# Patient Record
Sex: MALE | Race: WHITE
[De-identification: names, ages, dates, MRNs, and addresses within clinical notes are randomized per-mention and may not be internally consistent; named-entity substitution may affect disease eponyms.]

---

## 2017-02-25 NOTE — UC
Mary KATE Matthew, scribed for [facility] ServicesYaya MD on 02/25/17 at 1247 .





Lower Extremity/Ankle HPI





- HPI Summary


HPI Summary: 





Nurse's Note: pt states that over the past few weeks he's had a red, swollen, 

hard left lower leg that has been worsening, and is much worse over the past 

few days. pt's son states that the house pt is staying in may also have 

bedbugs. per pt's son, pt has "hardening of the arteries", and both legs have 

this problem off and on, though usually more on the right leg. on his right leg 

he had surgery 5 years ago and has had continual problems and infections with 

right leg. pt also sleeps with both legs dependent, he cannot elevate them. 








MD Note: MD Note; Afebrile, pulse oxygen 99%, 10/10 discomfort , weekly alcohol

, .5 ppd smoker, MI 15 years ago, chronic right leg infections. 








In Room Note: A 67 y/o male presents to [facility] with increased pre tibial right 

leg pain. He always has chronic right leg pain. The patient has been taking 

amoxicillin for 5 years according to the son for a chronic knee infection. The 

patient is unable to move his right leg at the joint. He denies nausea, vomiting

, and diarrhea today. He also has chronic back pain. Dr. Nicolas requested the 

patient present to [facility] for possible skin infection. No Hx of diabetes. Where 

the patient's living has an infestation of bed bugs and the son states the 

patient may have bee scratching his legs. 








- History of Current Complaint


Chief Complaint: UCLowerExtremity


Stated Complaint: LEG COMPLAINT


Time Seen by Provider: 02/25/17 12:25


Hx Obtained From: Patient


Onset/Duration: Still Present


Severity Initially: Moderate


Severity Currently: Moderate


Pain Intensity: 10


Pain Scale Used: 0-10 Numeric





- Allergies/Home Medications


Allergies/Adverse Reactions: 


 Allergies











Allergy/AdvReac Type Severity Reaction Status Date / Time


 


Fentanyl Allergy Severe Itching Verified 02/25/17 11:28


 


FENTANYL PATCH Allergy Severe SEVERE Uncoded 02/25/17 11:28





   ITCHING  














PMH/Surg Hx/FS Hx/Imm Hx


Endocrine History Of: 


   Denies: Diabetes, Thyroid Disease


Cardiovascular History Of: Reports: Cardiac Disorders - Heart attack 15 years 

ago, 8 years ago "on life support bc heart stopped", Myocardial Infarction, 

Deep Vein Thrombosis


   Denies: Hypertension, Pacemaker/ICD, Congestive Heart Failure


Respiratory History Of: Reports: COPD, Asthma


   Denies: Bronchitis, Pulmonary Embolism


GI/ History Of: 


   Denies: Ulcer, Renal Disease


Neurological History Of: 


   Denies: Seizures, Migraine


Psychological History Of: Reports: Depression


   Denies: Anxiety


Other History Of: 


   Negative For: Anticoagulant Therapy





- Surgical History


Surgical History: Yes


Surgery Procedure, Year, and Place: [place] [facility] [place]; RTHR 

April 2014; Right total knee replacement june 5 2014, [facility] [place].  right ear surgery





- Family History


Known Family History: Positive: Cardiac Disease, Other - CANCER





- Social History


Alcohol Use: Weekly


Alcohol Amount: NOT SINCE NEW YEARS


Substance Use Type: None


Smoking Status (MU): Current Every Day Smoker


Type: Cigarettes


Amount Used/How Often: 1 - 2 PPD


Length of Time of Smoking/Using Tobacco: 50 YEARS


Have You Smoked in the Last Year: Yes


When Did the Patient Quit Smoking/Using Tobacco: 2 MONTHS AGO


Household Exposure Type: Cigarettes





- Immunization History


Most Recent Influenza Vaccination: 2013


Most Recent Tetanus Shot: Within past 10 years


Most Recent Pneumonia Vaccination: 2012





Review of Systems


Constitutional: Negative


Skin: Other - Right leg erythema


Eyes: Negative


ENT: Negative


Respiratory: Negative


Cardiovascular: Negative


Gastrointestinal: Negative


Genitourinary: Negative


Motor: Negative


Neurovascular: Negative


Musculoskeletal: Myalgia - Increased right leg pain


Neurological: Negative


Psychological: Negative


All Other Systems Reviewed And Are Negative: Yes





Physical Exam


Triage Information Reviewed: Yes


Appearance: Other: - unkempt


Vital Signs: 


 Initial Vital Signs











Temp  98.6 F   02/25/17 11:17


 


Pulse  94   02/25/17 11:17


 


Resp  16   02/25/17 11:17


 


BP  114/54   02/25/17 11:17


 


Pulse Ox  99   02/25/17 11:17











Vital Signs Reviewed: Yes


Eyes: Positive: Conjunctiva Clear


ENT: Positive: Hearing grossly normal, Pharynx normal, TMs normal, Other: - 

[place].  Negative: Muffled/hoarse voice


Neck: Positive: Supple, Nontender


Respiratory: Positive: Chest non-tender, Lungs clear, Normal breath sounds, No 

respiratory distress


Cardiovascular: Positive: RRR, No Murmur


Abdomen Description: Positive: Nontender, No Organomegaly, Soft


Bowel Sounds: Positive: Present


Musculoskeletal: Positive: Other: - THE RIGHT LEG WILL NOT EXTENDED AT THE KNEE 

JOIN;


Neurological: Positive: Alert


Psychological: Positive: Age Appropriate Behavior


Skin Exam: Other -  STASIS DERMATITIS BILATERAL LE; BOTH LEGS SHOW ERYTHEMA AND 

ITS DIFFICULTY TO TELL WHETHER THIS IS AN INFECTION OR CHRONIC





Lower Extremity Course/Dx





- Course


Course Of Treatment: Discussed with the patient and his son who is his caregiver

, the fact that his examination is difficulty because of chronic leg changes. 

The fact that hes c/o of increase RLE pain and erythema could be a new skin 

infection that would not be covered by the amoxicillin. I am therefore changing 

him to Keflex and the patient son will recheck with Dr. Nicolas on Monday.





- Differential Dx/Diagnosis


Differential Diagnosis/HQI/PQRI: Other - New onset RLE cellulitis vs chronic 

stasis changes


Provider Diagnoses: Cellulitis, pre-tibial, right





Discharge





- Discharge Plan


Condition: Stable


Disposition: HOME


Prescriptions: 


Cephalexin CAP* [Keflex CAP*] 500 mg PO TID #30 cap MDD 3


Patient Education Materials:  Cellulitis (ED)


Referrals: 


Randi Nicolas MD [Primary Care Provider] - 


Additional Instructions: 


AS WE DISCUSSED:





You may have a new skin infection of your right lower leg.  It's hard to tell 

because of your chronic changes.





Change your medication for the next 10 days and check with Dr. Nicolas on 

Monday. GO TO ED FOR INCREASED PAIN, TEMPERATURE, SWELLING REDNESS.





Warm moist heat to the area of discomfort and redness.  





The documentation as recorded by the Mary gay Matthew accurately 

reflects the service I personally performed and the decisions made by me, 

Yaya Weber MD.

## 2017-08-14 NOTE — RAD
INDICATION:  Right lower extremity swelling and erythema.



COMPARISON:  Comparison is made with a prior lower extremity venous duplex study from

March 10, 2017.



TECHNIQUE:  Multiple real-time, color flow and Doppler tracings of the right lower

extremity were obtained.



FINDINGS: The common femoral, femoral, profunda femoral and popliteal veins all

demonstrate normal compressibility, augmentation with compression and phasic response with

respiration.



The posterior tibial and peroneal veins demonstrate normal compressibility and

augmentation with compression.



IMPRESSION:  NO EVIDENCE FOR DEEP VENOUS THROMBOSIS.

## 2017-08-14 NOTE — RAD
INDICATION:  Shortness of breath.



COMPARISON:  Comparison is made with a prior chest x-ray study from September 11, 2014.



TECHNIQUE: AP and lateral views of the chest were obtained.



FINDINGS:   The heart is within normal limits in size. Mediastinal and hilar contours

appear within normal limits.



The right lung base is cut off in the AP view limiting the study. The lungs are

hyperinflated with flattening of the diaphragms consistent with chronic obstructive

pulmonary disease. There are small bibasilar infiltrates. No pleural effusion is seen. 



IMPRESSION:  

1. LIMITED STUDY.

2. SMALL BIBASILAR INFILTRATES.

3. COPD.

## 2017-08-15 NOTE — RAD
Indication: Right lower extremity arterial insufficiency.



Contrast: Administered 125.1 ml of OMNIPAQUE 350 mg/ml



CTA of the abdominal aorta and lower extremity runoff was performed. Noncontrast and

arterial phase images were obtained.



The abdominal aorta demonstrates no aneurysmal dilatation. Celiac axis and superior

mesenteric artery are patent although there is calcified origin of the superior mesenteric

artery. Atherosclerotic aorta is noted. Calcified common iliac arteries are noted.

Atherosclerosis of both external iliac artery is noted.



The right common femoral artery, femoral artery and popliteal artery are limited in

evaluation of the right lower extremity. The left lower extremity demonstrates

atherosclerosis without definite stenosis of the left common femoral artery and femoral

artery. Atherosclerosis of the distal left femoral artery is present.



In the right calf pain appears to be a patent posterior artery and anterior tibial artery.

The left calf demonstrates patent anterior tibial, peroneal and posterior tibial artery.



The liver demonstrates hepatic steatosis. The pancreas demonstrates no mass or pancreatic

ductal dilatation. The spleen is normal in size. No adrenal lesions are noted. The kidneys

demonstrate symmetric nephrograms. Pancreas demonstrates no mass or pancreatic duct

dilatation. The colon is filled with stool.



IMPRESSION: Extensive hardware is noted in the right lower extremity. The calf vessels

demonstrates patent posterior tibial and anterior tibial arteries.



The left lower extremity demonstrates three-vessel runoff with atherosclerosis throughout.



The aorta and iliac arteries demonstrates atherosclerosis.

## 2017-08-15 NOTE — ECHO
Patient:      CHRISTOPHER ISBELL 

[facility] Rec#:     [id number]            :          1950          

Date:         08/15/2017            Age:          66y                 

Account#:     [id number]          Height:       175.3 cm / 69.0 in

Accession#:   [id number]           Weight:       75.3 kg / 166.0 lbs

Sex:          M                     BSA:          1.9

Room#:        [facility]                 

Admit Date#:  2017          

Type:         Inpatient

 

Referring:    Matteo Hdz

Reading:      Wilbert Dorsey MD

Sonographer:  Светлана Davidson RN RDCS

CC:           Randi Nicolas MD

______________________________________________________________________

 

Transthoracic Echocardiogram

 

Indication:

Lower extremity edema

BP:           105/57

HR:           87

Rhythm:       NSR with PACs

 

Findings     

History:

CAD, MI, PVD, COPD, factor V Leiden deficiency, ETOH abuse, smokes

cigars 

 

Technical Comments:

The study is technically limited due to poor parasternal windows.  The

study is technically limited due to the patient's history of COPD.  The

study is technically limited due to the patient's smoking history.

Completed at 1000.  

 

Left Ventricle:

The left ventricular chamber size is normal. Septal wall hypertrophy is

observed. There is a focal wall motion abnormality present.The posterior

wall wall appears more hypokinetic than the anteroseptal inferior wall.

There is mildly decreased left ventricular systolic function. The

estimated ejection fraction is 40-45%.  Abnormal left ventricular

diastolic filling is observed, consistent with impaired relaxation. The

absence of left atrial enlargement suggests this finding is not of a

chronic nature. 

 

Left Atrium:

The left atrial chamber size is normal. 

 

Right Ventricle:

The right ventricle wall thickness is mildly increased. The right

ventricular cavity size is normal. The right ventricular global systolic

function is mildly reduced. 

 

Right Atrium:

The right atrial cavity size is normal. There is evidence of an atrial

septal aneurysm.  

 

Aortic Valve:

The aortic valve leaflets are mildly thickened. There is no evidence of

aortic regurgitation. There is no evidence of aortic stenosis. 

 

Mitral Valve:

The mitral valve leaflets appear normal. There is a trace of mitral

regurgitation. There is no evidence of mitral stenosis. 

 

Tricuspid Valve:

The tricuspid valve leaflets are normal.  There is trace to mild

tricuspid regurgitation. There is evidence of moderate pulmonary

hypertension. 

 

Pulmonic Valve:

The pulmonic valve structure is not well visualized. 

 

Pericardium:

There is no significant pericardial effusion. A pericardial fat pad is

visualized. 

 

Aorta:

The ascending aorta is not well visualized. The aortic arch is not well

visualized.  There is no dilation of the aortic root. 

 

Pulmonary Artery:

The main pulmonary artery is not well visualized. 

 

Venous:

The inferior vena cava is dilated.  There is a greater than 50%

respiratory change in the inferior vena cava dimension. 

 

Conclusions

The posterior wall wall appears more hypokinetic than the anteroseptal

inferior wall.

There is mildly decreased left ventricular systolic function.

The estimated ejection fraction is 40-45%. 

Abnormal left ventricular diastolic filling is observed, consistent with

impaired relaxation. The absence of left atrial enlargement suggests

this finding is not of a chronic nature.

The right ventricular global systolic function is mildly reduced.

There is a trace of mitral regurgitation.

There is trace to mild tricuspid regurgitation.

There is evidence of moderate pulmonary hypertension.

The inferior vena cava is dilated. 

There is a greater than 50% respiratory change in the inferior vena cava

dimension.

Compared to 2013 the pulmonary HTN is now noted. 

 

Measurements     

Name                    Value         Normal Range            

RVDdMajor (2D)          3.3 cm        (2.2 - 4.4)             

RVAW (2D)               0.9 cm        (0.2 - 0.5)             

RAd ISD 4CH             4.4 cm        (3.4 - 4.9)             

RA (A4C)W               4 cm          (2.9 - 4.6)             

IVSd (2D)               1.1 cm        (0.6 - 1)               

LVPWd (2D)              1 cm          (0.6 - 1)               

LVIDd (2D)              4.4 cm        (3.6 - 5.4)             

LVIDs (2D)              3.4 cm        -                        

LV FS (2D)              23 %          (25 - 45)               

Aortic Annulus          2.3 cm        (1.4 - 2.6)             

Ao root diameter (2D)   3.2 cm        (2.1 - 3.5)             

LA dimension (AP) 2D    3.6 cm        (2.3 - 3.8)             

LAd ISD 4CH             4.3 cm        (2.9 - 5.3)             

LA ISD 4CH W            3.7 cm        (2.5 - 4.5)             

 

Name                    Value         Normal Range            

LA ESV SP 4CH (A/L)     28 ml         -                        

LA ESV SP 2CH (A/L)     44 ml         -                        

LA ESV BP (A/L)         37 ml         -                        

LA ESV BP (A/L) index   19.4 ml/m2    -                        

LA ESV SP 4CH (MOD)     25 ml         -                        

LA ESV SP 2CH (MOD)     38 ml         -                        

 

Name                    Value         Normal Range            

MV E-wave Vmax          0.41 m/sec    -                        

MV deceleration time    174 msec      -                        

MV A-wave Vmax          0.83 m/sec    -                        

MV E:A ratio            0.5 ratio     -                        

LV septal e' Vmax       0.07 m/sec    -                        

LV lateral e' Vmax      0.08 m/sec    -                        

LV E:e' septal ratio    5.9 ratio     -                        

LV E:e' lateral ratio   5.1 ratio     -                        

 

Name                    Value         Normal Range            

AV Vmax                 1.3 m/sec     -                        

AV VTI                  29.1 cm       -                        

AV peak gradient        6.4 mmHg      -                        

AV mean gradient        4.3 mmHg      -                        

LVOT Vmax               0.93 m/sec    -                        

LVOT VTI                17.6 cm       -                        

LVOT peak gradient      3.5 mmHg      -                        

LVOT mean gradient      1.7 mmHg      -                        

 

Name                    Value         Normal Range            

TR Vmax                 3.2 m/sec     -                        

TR peak gradient        41 mmHg       -                        

RAP                     8 mmHg        -                        

RVSP                    49 mmHg       -                        

IVC diameter            2.2 cm        -                        

 

Name                    Value         Normal Range            

PV Vmax                 0.61 m/sec    -                        

 

Electronically signed by: Wilbert Dorsey MD on 08/15/2017 11:50:32

## 2017-08-15 NOTE — HP
CC:  Randi Nicolas MD *

 

HISTORY AND PHYSICAL:

 

DATE OF ADMISSION:  08/14/17

 

PRIMARY CARE PHYSICIAN:  Randi Nicolas MD.

 

CHIEF COMPLAINT:  Right leg pain.

 

HISTORY OF PRESENT ILLNESS:  The patient is a 66-year-old gentleman who 
presented to [facility] with a chief complaint of right leg pain.  
He cannot tell me precisely when it started but it has been getting worse over 
the last couple of months.  He became more worried today when there was fluid 
actually draining out of his leg.  His son insisted he go to the emergency room 
for evaluation.  In fact, he was at his PCP's yesterday for evaluation and 
treatment.  He denies any fevers, but has had chills occasionally.  It is red, 
warm and somewhat tender.  He took some pain medicine prescribed to him by his 
PCP, but he said it did not help that much. He denies any shortness of breath, 
chest pain or palpitations.  In the ED, the patient was evaluated and found 
likely to have a cellulitis.  There was also concern for a possible pneumonia 
but he has no white count, he is afebrile and he is not short of breath.

 

PAST MEDICAL HISTORY:  He has got a past medical history significant for COPD, 
thrombocytopenia, peripheral vascular disease, coronary artery disease, history 
of an MI, chronic hyponatremia, alcohol abuse, osteoporosis, GI bleed, history 
of factor v Leiden deficiency.

 

PAST SURGICAL HISTORY:  Significant for distal femur replacement, right hip ORIF
, right hip replacement, hernia repair, ear surgery.

 

MEDICATIONS:  The patient is unaware of his medications.  We will attempt to 
get the list tomorrow.

 

ALLERGIES:  Adverse reaction to FENTANYL.

 

FAMILY HISTORY:  Mother had a heart disease.  Father had history of unknown 
cancer.

 

SOCIAL HISTORY:  He still smokes about 3 cigars a day.  Occasional alcohol, 
last week he had 4 to 5 cans in 1 day.  No recreational drug use.  He 
apparently has a history of more significant alcohol use.  He is a retired 
worker.  He is . He has 2 sons.  His son, Mushtaq Bwoden, is his 
healthcare proxy.

 

REVIEW OF SYSTEMS:  A 14-point review of systems was completed with the 
patient. All pertinent positives and negatives are in the history of present 
illness, otherwise it is negative.

 

                               PHYSICAL EXAMINATION

 

GENERAL:  A very pleasant gentleman, sitting up in bed, in no acute distress.

 

VITAL SIGNS:  Temperature 98 degrees, heart rate 90 beats per minute, 
respiratory rate 20 breaths per minute, pulse ox 92% on room air, blood 
pressure 130/72.

 

HEENT:  Normocephalic, atraumatic.  Pupils equal, round, and reactive to light. 
Poor dentition.

 

CHEST:  Clear to auscultation and percussion bilaterally.

 

CARDIOVASCULAR:  S1, S2 appreciated.  Regular rate and rhythm.

 

ABDOMEN:  Positive bowel sounds in all 4 quadrants.  Soft and nontender.

 

EXTREMITIES:  No cyanosis or clubbing.  He has got edema, redness, warmth and 
tenderness on his right lower calf.

 

NEUROLOGIC:  He is alert and oriented x3.  Moves all extremities.

 

SKIN:  No rashes.  The only abnormality is the erythema on his right lower 
extremity and the onychomycosis on his toenails.

 

 DIAGNOSTIC STUDIES/LABORATORY DATA:  White count 8.3, hemoglobin 12.2, 
hematocrit 38, platelets 256,000.  Sodium is 127, potassium 3.8, chloride 92, 
CO2 30, BUN 5, creatinine 0.67, glucose is 98, lactic acid is 1.2.  Urinalysis 
is unremarkable.

 

EKG shows normal sinus rhythm at 86 beats per minute.  Normal axis.  No acute 
ST or T-wave changes.  



Venous Duplex was interpreted by Radiology as no evidence for DVT. 



Chest x-ray showed limited study, small basilar infiltrates, bibasilar 
infiltrates and COPD.

 

ASSESSMENT AND PLAN:

1.  Cellulitis, likely diagnosis.  I think it is unlikely he has a deep vein 
thrombosis, especially with a negative venous Duplex.  Apparently he has factor 
V Leiden deficiency; however, and was supposed to be on anticoagulation.  This 
has been ______ in the past and he is still not on it.  I am not sure how 
compliant he is with his followup and his medications.  For now, I will put him 
on vancomycin and Zosyn because his cellulitis appears fairly significant and I 
do not know how long this has been going on for.  It may benefit from ID 
consult.  Right now, there does not appear to be any kind of a wound culture.

2.  Chronic obstructive pulmonary disease.  His current medications are 
unknown. We will await his medications and place him on them as soon as we are 
aware.

3.  Gastroesophageal reflux disease.  We will place him on PPIs that he has 
been on before.

4.  Coronary artery disease.  Again unclear what medications, but we will 
reinitiate once we are given his appropriate list.

5.  Factor V Leiden deficiency.  See above.  Thought he should be on 
anticoagulation, but has not been.  This should be addressed, but probably best 
as an outpatient.

6.  FEN.  Regular diet.

7.  DVT prophylaxis.  Heparin subcu.

8.  The patient is a full code.

 

TIME SPENT:  Over 85 minutes were spent on this H and P; more than 45 minutes 
was spent in direct face-to-face contact with the patient in evaluation, 
physical exam, counseling, and coordination of care.

 

[id number]/CPS #: [id number]

MTDD

## 2017-08-15 NOTE — PN
Subjective


Date of Service: 08/15/17


Interval History: 





This is a 67 yo gentleman with multiple medical problems including PVD who 

presented yesterday with c/o RLE pain.  Pain has been present for the last 

couple of month but progressively worse and he noted some drainage yesterday.  

He was admitted yesterday with concern for cellulitis but no leukocytosis or 

fever noted.





Today, patient reports his pain and appearance of his leg is similar to the 

time of admission.  He has remained afebrile.  He reports improvement in pain 

with legs in a dependent position.





Objective


Active Medications: 








Acetaminophen (Tylenol Tab*)  650 mg PO Q4H PRN


   PRN Reason: FEVER/PAIN


   Last Admin: 08/15/17 09:17 Dose:  650 mg


Heparin Sodium (Porcine) (Heparin Vial(*))  5,000 units SUBCUT Q8HR [facility]


   Last Admin: 08/15/17 05:56 Dose:  5,000 units


Hydromorphone HCl (Dilaudid Iv*)  1 mg IV SLOW PU Q4H PRN


   PRN Reason: PAIN


Piperacillin Sod/Tazobactam (Sod 3.375 gm/ Sodium Chloride)  100 mls @ 25 mls/

hr IVPB Q8H [facility]


   Last Admin: 08/15/17 10:59 Dose:  25 mls/hr


Vancomycin HCl 1,000 mg/ (Sodium Chloride)  250 mls @ 166.667 mls/hr IVPB Q8H 

[facility]


   Last Admin: 08/15/17 05:56 Dose:  166.667 mls/hr


Morphine Sulfate (Morphine Inj (Syringe)*)  2 mg IV Q2H PRN


   PRN Reason: PAIN


Ondansetron HCl (Zofran Inj*)  4 mg IV Q4H PRN


   PRN Reason: NAUSEA


Pharmacy Consult (Vancomycin Per Pharmacy*)  1 note FOLLOW UP . PRN


   PRN Reason: PER PROTOCOL


Pharmacy Profile Note (Vancomycin Trough Check)  1 note FOLLOW UP 0530 ONE


   Stop: 08/16/17 05:31











Vital Signs:











Temp Pulse Resp BP Pulse Ox


 


 98.0 F   84   20   114/67   96 


 


 08/15/17 09:13  08/15/17 09:13  08/15/17 11:38  08/15/17 09:13  08/15/17 09:13











Oxygen Devices in Use Now: None


Appearance: 67 yo gentleman who appears much older than stated age in NAD, but 

very uncomfortable with manipulation of his R leg


Respiratory: Symmetrical Chest Expansion and Respiratory Effort, - - few exp 

wheezes noted


Cardiovascular: NL Sounds; No Murmurs; No JVD, RRR


Extremities: - - 1+RLE edema and trace LLE edema, RLE is very TTP, no palpable 

pulses


Skin: - - erythema of RLE including the foot and lower leg to the knee


Result Diagrams: 


 08/15/17 09:12





 08/15/17 09:12





Assess/Plan/Problems-Billing


Assessment: 





This is a 67 yo gentleman with COPD, thrombocytopenia, PVD, CAD with h/o AMI, 

chronic hyponatremia, alcoholism, factor V leiden who presented with c/o RLE 

pain who was admitted for possible cellulitis.





- Patient Problems


(1) Acute pain of right lower extremity


Comment: 


Initial treatment for cellulitis, but he has no leukocytosis or fever


He reports improvement with legs in a dependent position and he has no palpable 

distal pulses with known PVD, so concern for arterial insufficiency is higher 

on my differential


Will cont abx, but obtain CTA with runoff to assess vascular status   





(2) COPD (chronic obstructive pulmonary disease)


Comment: 


Few wheezes on exam but no hypoxia or resp distress to suggest exacerbation


Cont home inhalers and prn DuoNebs   





(3) PVD (peripheral vascular disease)


Comment: 


Start ASA, unsure if he is on a statin at home   





(4) CAD (coronary artery disease)


Comment: 


No evidence of ACS


Cont med management   





(5) Hyponatremia


Comment: 


Mild and appears chronic


Asymptomatic, will cont to monitor   





(6) Alcoholism


Comment: 


No evidence of acute withdrawal at this time   





(7) Factor V Leiden mutation


Comment: 


Reported h/o of anticoagulation, but patient is unclear on this history   





(8) Full code status








(9) DVT prophylaxis


Comment: 


SQ heparin   


Status and Disposition: 





Inpatient. Unsure of discharge plan at this time

## 2017-08-15 NOTE — ED
Justo KATE Thomas, scribed for Tashi English MD on 08/14/17 at 1654 .





Lower Extremity





- HPI Summary


HPI Summary: 





The pt is a 65 y/o M presenting to the ED c/o chronic R leg pain that began two 

months ago. The pain is aggravated by movement and the pain is alleviated by 

nothing. The patient has not treated the pain with anything PTA. Pt 

additionally c/o fluid drainage to his R leg, R leg erythema, and SOB .PMHx: MI

, DVT, asthma, and COPD. PSHx: femur rodding, R knee replacement, and ear 

surgery. SHx: smoking (1/2 PPD), occasional alcohol use, no illicit drug use. 

FHx: cardiac disease, CA. The patient does not shower and reports that he 

occasionally takes sponge baths. 





- History of Current Complaint


Chief Complaint: EDShortnessOfBreath


Stated Complaint: SWELLING IN LEGS


Time Seen by Provider: 08/14/17 16:39


Hx Obtained From: Patient


Severity Currently: Moderate


Timing: Constant


Associated Signs And Symptoms: Positive: Other - POS: R leg pain, R leg erythema

, drainage to R leg, and SOB


Aggravating Factor(s): Movement


Alleviating Factor(s): Nothing





- Allergies/Home Medications


Allergies/Adverse Reactions: 


 Allergies











Allergy/AdvReac Type Severity Reaction Status Date / Time


 


Fentanyl Allergy Severe Itching Verified 02/25/17 11:28


 


FENTANYL PATCH Allergy Severe SEVERE Uncoded 02/25/17 11:28





   ITCHING  














PMH/Surg Hx/FS Hx/Imm Hx


Previously Healthy: No


Endocrine/Hematology History: Reports: Hx Blood Disorders - factor 5 clotting 

disorder


   Denies: Hx Anticoagulant Therapy, Hx Diabetes, Hx Thyroid Disease


Cardiovascular History: Reports: Hx Angina, Hx Coronary Artery Disease, Hx Deep 

Vein Thrombosis, Hx Myocardial Infarction, Hx Peripheral Vascular Disease, 

Other Cardiovascular Problems/Disorders - fACTOR V LEIDEN CLOTTING DISORDER


   Denies: Hx Cardiomegaly, Hx Congestive Heart Failure, Hx Hypertension, Hx 

Pacemaker/ICD, Hx Rheumatic Fever, Hx Valvular Heart Disease


Respiratory History: Reports: Hx Asthma, Hx Chronic Obstructive Pulmonary 

Disease (COPD), Other Respiratory Problems/Disorders - Longterm smoker


   Denies: Hx Pulmonary Edema, Hx Pulmonary Embolism


GI History: Reports: Hx Gastroesophageal Reflux Disease, Other GI Disorders - 

HX OF GI BLEED - NO PROBLEMS NOW


   Denies: Hx Cirrhosis, Hx Crohn's Disease, Hx Hiatal Hernia, Hx Irritable 

Bowel, Hx Jaundice, Hx Ulcer


 History: 


   Denies: Hx Renal Disease, Other  Problems/Disorders


Musculoskeletal History: Reports: Hx Arthritis, Hx Back Problems, Hx 

Osteoporosis, Other Musculoskeletal History - Fx hip sp fall at home hx


   Denies: Hx Rheumatoid Arthritis, Hx Bursitis


Sensory History: Reports: Hx Cataracts - HAVING CATARACT SURGERY, Hx Contacts 

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


   Denies: Hx Hearing Aid


Opthamlomology History: Reports: Hx Cataracts - HAVING CATARACT SURGERY, Hx 

Contacts or Glasses - for reading


Neurological History: 


   Denies: Hx Headaches, Hx Migraine, Hx Seizures


   Comment Only: Other Neuro Impairments/Disorders - NEUROPATHY/HX SUBSTANCE 

ABUSE


Psychiatric History: Reports: Hx Depression, Hx Substance Abuse


   Denies: Hx Anxiety, Hx Panic Disorder





- Cancer History


Hx Chemotherapy: No





- Surgical History


Surgery Procedure, Year, and Place[place] [facility] [place]TH[place] 

April 2014; Right total knee replacement june 5 2014, [facility] [place].  right ear surgery


Hx Anesthesia Reactions: No





- Immunization History


Date of Tetanus Vaccine: 2011


Date of Influenza Vaccine: None


Infectious Disease History: No


Infectious Disease History: Reports: Hx of Known/Suspected MRSA


   Denies: Hx Hepatitis, Traveled Outside the [place] in Last 30 Days





- Family History


Known Family History: Positive: Cardiac Disease, Other - CANCER





- Social History


Alcohol Use: Weekly


Alcohol Amount: NOT SINCE NEW YEARS


Substance Use Type: Reports: None


Hx Tobacco Use: Yes


Smoking Status (MU): Current Every Day Smoker


Type: Cigarettes


Amount Used/How Often: 1 - 2 PPD


Length of Time of Smoking/Using Tobacco: 50 YEARS


Have You Smoked in the Last Year: Yes





Review of Systems


Positive: Shortness Of Breath


Positive: Other - POS: R leg pain with drainage


Positive: Other - POS: R leg erythema


All Other Systems Reviewed And Are Negative: Yes





Physical Exam





- Summary


Physical Exam Summary: 





VITAL SIGNS: Reviewed.


GENERAL: ~Patient is an elderly male with poor hygiene who is lying comfortable 

in the stretcher. ~Patient is not in any acute respiratory distress.


HEAD AND FACE: No signs of trauma. ~No ecchymosis, hematomas or skull 

depressions. No sinus tenderness.


EYES: PERRLA, EOMI x 2, No injected conjunctiva, no nystagmus.


EARS: Hearing grossly intact. Ear canals and tympanic membranes are within 

normal limits.


MOUTH: Oropharynx within normal limits.


NECK: Supple, trachea is midline, no adenopathy, no JVD, no carotid bruit, no c-

spine tenderness, neck with full ROM.


CHEST: Symmetric, no tenderness at palpation


LUNGS: Clear to auscultation bilaterally. No wheezing or crackles.


CVS: Regular rate and rhythm, S1 and S2 present, no murmurs or gallops 

appreciated.


ABDOMEN: Soft, non-tender. No signs of distention. No rebound no guarding, and 

no masses palpated. Bowel sounds are normal.


EXTREMITIES: He has RLE pain. The RLE is with positive erythema and positive 

tenderness to the calf. He has good pulses in his extremities. FROM in all 

major joints, no edema, no cyanosis or clubbing.


NEURO: Alert and oriented x 3. No acute neurological deficits. Speech is normal 

and follows commands.


SKIN: Dry and warm


Triage Information Reviewed: Yes


Vital Signs On Initial Exam: 


 Initial Vitals











Pulse BP Pulse Ox


 


 155   121/57   89 


 


 08/14/17 16:26  08/14/17 16:26  08/14/17 16:26











Vital Signs Reviewed: Yes





- Mindi Coma Scale


Coma Scale Total: 15





Diagnostics





- Vital Signs


 Vital Signs











  Temp Pulse Resp BP Pulse Ox


 


 08/14/17 16:33  98.7 F  85  16  121/57  99


 


 08/14/17 16:30   83  16  125/62  97


 


 08/14/17 16:26   155   121/57  89














- Laboratory


Lab Results: 


 Lab Results











  08/14/17 08/14/17 08/14/17 Range/Units





  16:45 16:45 16:45 


 


WBC  8.3    (3.5-10.8)  10^3/ul


 


RBC  4.81    (4.0-5.4)  10^6/ul


 


Hgb  12.2 L    (14.0-18.0)  g/dl


 


Hct  38 L    (42-52)  %


 


MCV  79 L    (80-94)  fL


 


MCH  25 L    (27-31)  pg


 


MCHC  32    (31-36)  g/dl


 


RDW  20 H    (10.5-15)  %


 


Plt Count  256    (150-450)  10^3/ul


 


MPV  8    (7.4-10.4)  um3


 


Neut % (Auto)  68.0    (38-83)  %


 


Lymph % (Auto)  11.0 L    (25-47)  %


 


Mono % (Auto)  14.9 H    (1-9)  %


 


Eos % (Auto)  4.8    (0-6)  %


 


Baso % (Auto)  1.3    (0-2)  %


 


Absolute Neuts (auto)  5.6    (1.5-7.7)  10^3/ul


 


Absolute Lymphs (auto)  0.9 L    (1.0-4.8)  10^3/ul


 


Absolute Monos (auto)  1.2 H    (0-0.8)  10^3/ul


 


Absolute Eos (auto)  0.4    (0-0.6)  10^3/ul


 


Absolute Basos (auto)  0.1    (0-0.2)  10^3/ul


 


Absolute Nucleated RBC  0.01    10^3/ul


 


Nucleated RBC %  0.1    


 


Sodium   127 L   (133-145)  mmol/L


 


Potassium   3.8   (3.5-5.0)  mmol/L


 


Chloride   92 L   (101-111)  mmol/L


 


Carbon Dioxide   30   (22-32)  mmol/L


 


Anion Gap   5   (2-11)  mmol/L


 


BUN   5 L   (6-24)  mg/dL


 


Creatinine   0.67   (0.67-1.17)  mg/dL


 


Est GFR ( Amer)   152.2   (>60)  


 


Est GFR (Non-Af Amer)   118.3   (>60)  


 


BUN/Creatinine Ratio   7.5 L   (8-20)  


 


Glucose   98   ()  mg/dL


 


Lactic Acid    1.2  (0.5-2.0)  mmol/L


 


Calcium   8.3 L   (8.6-10.3)  mg/dL


 


Total Bilirubin   0.50   (0.2-1.0)  mg/dL


 


AST   17   (13-39)  U/L


 


ALT   7   (7-52)  U/L


 


Alkaline Phosphatase   124 H   ()  U/L


 


C-Reactive Protein   4.94   (< 5.00)  mg/L


 


Total Protein   8.9   (6.4-8.9)  g/dL


 


Albumin   3.3   (3.2-5.2)  g/dL


 


Globulin   5.6 H   (2-4)  g/dL


 


Albumin/Globulin Ratio   0.6 L   (1-3)  











Result Diagrams: 


 08/15/17 09:12





 08/15/17 09:12


Lab Statement: Any lab studies that have been ordered have been reviewed, and 

results considered in the medical decision making process.





- Radiology


  ** CXR


Xray Interpretation: Positive (See Comments) - 1. LIMITED STUDY. 2. SMALL 

BIBASILAR INFILTRATES. 3. COPD.


Radiology Interpretation Completed By: Radiologist





- EKG


  ** 18:37


Cardiac Rate: NL - 86 BPM


EKG Interpretation: Sinus rhythm with no ST elevations. 





- Additional Comments


Diagnostic Additional Comments: 





US Lower extremity. Interpreted by radiologist. Impression: No evidence for DVT.





Lower Extremity Course/Dx





- Course


Assessment/Plan: The pt is a 65 y/o M presenting to the ED c/o chronic R leg 

pain that began two months ago. The pain is aggravated by movement and the pain 

is alleviated by nothing. The patient has not treated the pain with anything 

PTA. Pt additionally c/o fluid drainage to his R leg, R leg erythema, and SOB 

.PMHx: MI, DVT, asthma, and COPD. PSHx: femur rodding, R knee replacement, and 

ear surgery. SHx: smoking (1/2 PPD), occasional alcohol use, no illicit drug 

use. FHx: cardiac disease, CA. The patient does not shower and reports that he 

occasionally takes sponge baths.  Test results are without significant 

abnormality except a chronic anemia and a chronic hyponatremia. The US of the 

RLE is negative for DVT. However, the CXR reveals 1. LIMITED STUDY. 2. SMALL 

BIBASILAR INFILTRATES. 3. COPD. I have made the hospitalists aware of the 

patients condition. The patient is a sign out from Dr. English to Dr. Caballero 

pending the hospitalists evaluation of the patient.





- Diagnoses


Provider Diagnoses: 


 Pneumonia, Cellulitis








- Physician Notifications


Discussed Care Of Patient With: Jeff Ruggiero


Time Discussed With Above Provider: 19:22


Instructed by Provider To: Other - Dr. Ruggiero was made aware of the patient at 

19:22.





Discharge





- Discharge Plan


Condition: Fair


Disposition: OTHER


Discharge Disposition Comment: Sign out from Dr. English to Dr. Caballero pending 

hospitalist evaluation. 





The documentation as recorded by the Justo gay Thomas accurately reflects 

the service I personally performed and the decisions made by me, Tashi English MD.

## 2017-08-16 NOTE — PN
Subjective


Date of Service: 08/16/17


Interval History: 





Patient reports some improvement in his leg pain.  Swelling appears somewhat 

improved.  Remains afebrile.  No c/o CP or SOB





Objective


Active Medications: 








Acetaminophen (Tylenol Tab*)  650 mg PO Q4H PRN


   PRN Reason: FEVER/PAIN


   Last Admin: 08/15/17 09:17 Dose:  650 mg


Albuterol/Ipratropium (Duoneb (Albuterol 2.5 Mg/Ipratropium 0.5 Mg))  1 neb INH 

Q4H PRN


   PRN Reason: SOB/WHEEZING


Aspirin (Aspirin Low Dose Tab*)  81 mg PO DAILY [facility]


   Last Admin: 08/16/17 08:17 Dose:  81 mg


Atorvastatin Calcium (Lipitor*)  10 mg PO DAILY [facility]


Cilostazol (Pletal Tab*)  100 mg PO DAILY [facility]


Device (Tiotropium Inhaler Device*)  1 each .SEE ORDER .USE w/ SPIRIVA CAPS [facility]


Heparin Sodium (Porcine) (Heparin Vial(*))  5,000 units SUBCUT Q8HR [facility]


   Last Admin: 08/16/17 06:08 Dose:  5,000 units


Hydromorphone HCl (Dilaudid Iv*)  1 mg IV SLOW PU Q4H PRN


   PRN Reason: PAIN


Cefazolin Sodium 1 gm/ Sodium (Chloride)  50 mls @ 200 mls/hr IVPB Q8H [facility]


   Last Admin: 08/16/17 08:43 Dose:  200 mls/hr


Morphine Sulfate (Morphine Inj (Syringe)*)  2 mg IV Q2H PRN


   PRN Reason: PAIN


   Last Admin: 08/16/17 08:16 Dose:  2 mg


Omeprazole (Prilosec Cap*)  40 mg PO DAILY [facility]


Ondansetron HCl (Zofran Inj*)  4 mg IV Q4H PRN


   PRN Reason: NAUSEA


Tiotropium Bromide (Spiriva Cap.Inh*)  1 cap INH DAILY [facility]


   Last Admin: 08/16/17 07:57 Dose:  1 cap


Trazodone HCl (Desyrel Tab*)  150 mg PO BEDTIME PRN


   PRN Reason: SLEEP











Vital Signs:











Temp Pulse Resp BP Pulse Ox


 


 98.3 F   75   18   125/56   95 


 


 08/16/17 07:30  08/16/17 07:30  08/16/17 09:16  08/16/17 07:30  08/16/17 07:30











Oxygen Devices in Use Now: None


Appearance: Chronically ill appearing 67 yo gentleman in NAD


Respiratory: Symmetrical Chest Expansion and Respiratory Effort, Clear to 

Auscultation, - - breath sounds somewhat reduced diffusely


Cardiovascular: NL Sounds; No Murmurs; No JVD, RRR


Abdominal: NL Sounds; No Tenderness; No Distention


Extremities: - - bilateral LE edema, R>L ~1+


Skin: - - RLE erythema to the knee, few excoriated areas, but nothing open


Result Diagrams: 


 08/16/17 05:39





 08/16/17 05:39


Additional Lab and Data: 





.


Diagnostic Imaging: 





CTA aorta with runoff - patent R calf anterior and posterior tibial arteries, 

LLE shows intact flow with evidence of atherosclerosis





Echo - EF 40-45% with diastolic dysfunction and dilated IVC





Assess/Plan/Problems-Billing


Assessment: 





This is a 67 yo gentleman with COPD, thrombocytopenia, PVD, CAD with h/o AMI, 

chronic hyponatremia, alcoholism, factor V leiden who presented with c/o RLE 

pain who was admitted for possible cellulitis.





- Patient Problems


(1) Acute pain of right lower extremity


Comment: 


Initial treatment for cellulitis, but he has no leukocytosis or fever


Arterial studies shows intact flow in the R calf


Cont abx, switched to Cefazolin


Echo demonstrated fluid overload, initiate diuresis with IV Lasix   





(2) Chronic combined systolic and diastolic CHF (congestive heart failure)


Comment: 


Echo demonstrates mild fluid overload, but no evidence of acute exacerbation


EF 40-45%


Start IV Lasix   





(3) COPD (chronic obstructive pulmonary disease)


Comment: 


Few wheezes on exam but no hypoxia or resp distress to suggest exacerbation


Cont home inhalers and prn DuoNebs   





(4) PVD (peripheral vascular disease)


Comment: 


Start ASA


Cont atorvastatin and cilostazol   





(5) CAD (coronary artery disease)


Comment: 


No evidence of ACS


Cont med management   





(6) Hyponatremia


Comment: 


Mild and appears chronic


Asymptomatic, will cont to monitor   





(7) Alcoholism


Comment: 


No evidence of acute withdrawal at this time   





(8) Factor V Leiden mutation


Comment: 


Reported h/o of anticoagulation, but patient is unclear on this history   





(9) Full code status








(10) DVT prophylaxis


Comment: 


SQ heparin   


Status and Disposition: 





Inpatient. Anticipate dc in 1-2d

## 2017-08-17 NOTE — PN
Subjective


Date of Service: 08/17/17


Interval History: 





Patient reports little change in symptoms.  He continues to have pain in the 

RLE and reports more comfort with his legs in a dependent position.





Objective


Active Medications: 








Acetaminophen (Tylenol Tab*)  650 mg PO Q4H PRN


   PRN Reason: FEVER/PAIN


   Last Admin: 08/15/17 09:17 Dose:  650 mg


Albuterol/Ipratropium (Duoneb (Albuterol 2.5 Mg/Ipratropium 0.5 Mg))  1 neb INH 

Q4H PRN


   PRN Reason: SOB/WHEEZING


Aspirin (Aspirin Low Dose Tab*)  81 mg PO DAILY [facility]


   Last Admin: 08/17/17 08:20 Dose:  81 mg


Atorvastatin Calcium (Lipitor*)  10 mg PO DAILY [facility]


   Last Admin: 08/17/17 08:21 Dose:  10 mg


Cilostazol (Pletal Tab*)  100 mg PO DAILY [facility]


   Last Admin: 08/17/17 08:20 Dose:  100 mg


Device (Tiotropium Inhaler Device*)  1 each .SEE ORDER .USE w/ SPIRIVA CAPS [facility]


Heparin Sodium (Porcine) (Heparin Vial(*))  5,000 units SUBCUT Q8HR [facility]


   Last Admin: 08/17/17 05:13 Dose:  5,000 units


Hydromorphone HCl (Dilaudid Iv*)  1 mg IV SLOW PU Q4H PRN


   PRN Reason: PAIN


Cefazolin Sodium 1 gm/ Sodium (Chloride)  50 mls @ 200 mls/hr IVPB Q8H [facility]


   Last Admin: 08/17/17 08:19 Dose:  200 mls/hr


Morphine Sulfate (Morphine Inj (Syringe)*)  2 mg IV Q2H PRN


   PRN Reason: PAIN


   Last Admin: 08/17/17 08:35 Dose:  2 mg


Omeprazole (Prilosec Cap*)  40 mg PO DAILY [facility]


   Last Admin: 08/17/17 08:20 Dose:  40 mg


Ondansetron HCl (Zofran Inj*)  4 mg IV Q4H PRN


   PRN Reason: NAUSEA


Tiotropium Bromide (Spiriva Cap.Inh*)  1 cap INH DAILY [facility]


   Last Admin: 08/17/17 08:01 Dose:  1 cap


Trazodone HCl (Desyrel Tab*)  150 mg PO BEDTIME PRN


   PRN Reason: SLEEP











Vital Signs:











Temp Pulse Resp BP Pulse Ox


 


 98.3 F   90   18   116/63   92 


 


 08/17/17 07:41  08/17/17 08:02  08/17/17 08:35  08/17/17 07:41  08/17/17 08:02











Oxygen Devices in Use Now: None


Appearance: 67 yo gentleman who appears older than stated age. He appears 

mildly uncomfortable


Respiratory: Symmetrical Chest Expansion and Respiratory Effort, Clear to 

Auscultation


Cardiovascular: NL Sounds; No Murmurs; No JVD, RRR


Extremities: - - bilateral LE edema, R>L with extreme TTP over the R lower leg


Skin: - - RLE erythema


Neurological: Alert and Oriented x 3


Result Diagrams: 


 08/16/17 05:39





 08/16/17 05:39


Additional Lab and Data: 





.


Diagnostic Imaging: 





CTA aorta with runoff - patent R calf anterior and posterior tibial arteries, 

LLE shows intact flow with evidence of atherosclerosis





Echo - EF 40-45% with diastolic dysfunction and dilated IVC





Assess/Plan/Problems-Billing


Assessment: 





This is a 67 yo gentleman with COPD, thrombocytopenia, PVD, CAD with h/o AMI, 

chronic hyponatremia, alcoholism, factor V leiden who presented with c/o RLE 

pain who was admitted for possible cellulitis.





- Patient Problems


(1) Acute pain of right lower extremity


Comment: 


Initial treatment for cellulitis, but he has no leukocytosis or fever


Arterial studies shows intact flow in the R calf


Cont empiric tx for cellulitis with IV abx, switched to Cefazolin


Echo demonstrated fluid overload, initiated diuresis with IV Lasix with good 

response


Will attempt compression for further treatment of his LE edema in hopes it will 

help improve his pain   





(2) Chronic combined systolic and diastolic CHF (congestive heart failure)


Comment: 


Echo demonstrates mild fluid overload, but no evidence of acute exacerbation


EF 40-45%


Start IV Lasix   





(3) COPD (chronic obstructive pulmonary disease)


Comment: 


Few wheezes on exam but no hypoxia or resp distress to suggest exacerbation


Cont home inhalers and prn DuoNebs   





(4) PVD (peripheral vascular disease)


Comment: 


Start ASA


Cont atorvastatin and cilostazol   





(5) CAD (coronary artery disease)


Comment: 


No evidence of ACS


Cont med management   





(6) Hyponatremia


Comment: 


Mild and appears chronic


Asymptomatic, will cont to monitor   





(7) Alcoholism


Comment: 


No evidence of acute withdrawal at this time   





(8) Factor V Leiden mutation


Comment: 


Reported h/o of anticoagulation, but patient is unclear on this history   





(9) Full code status








(10) DVT prophylaxis


Comment: 


SQ heparin   


Status and Disposition: 





Inpatient. Anticipate dc in 1-2d

## 2017-08-17 NOTE — PN
Hospitalist Progress Note





Patient's family arrived this evening and provided a history to his nurse that 

he has a h/o prosthesis infection and has been on suppressive antibiotic 

therapy.  Will order contrasted MRI to eval for osteomyelitis that may explain 

his pain and indolent history.  Patient's family has also requested an 

orthopedic evaluation which can be addressed tomorrow.

## 2017-08-17 NOTE — RAD
INDICATION: Right lower extremity pain evaluate for osteomyelitis.



COMPARISON:  Comparison is made with a prior CT of the abdomen and pelvis and lower

extremities from August 15, 2017 and a prior x-ray study of the right knee from April 25, 2016.



TECHNIQUE: Axial, sagittal and coronal T1 and T2-weighted images of the right lower leg

were obtained. The exam is limited due to motion artifact. The patient refused intravenous

contrast and refused to finish the study.



FINDINGS: There is a metallic knee prostheses which causes artifact limiting the study.

There is diffuse soft tissue swelling present throughout the right lower leg. No focal

fluid collection or abscess is seen. No bone marrow edema or suspicious findings for

osteomyelitis are seen.



IMPRESSION:  LIMITED STUDY, NO GROSS EVIDENCE FOR OSTEOMYELITIS OR ABSCESS.

## 2017-08-18 NOTE — PN
Subjective


Date of Service: 08/18/17


Interval History: 





Mr. Bowden reports severe pain to his right leg, from the knee down into 

his foot.  He notes that he has had severe pain for quite some time.  He denies 

chest pain, SOB, nausea, or abdominal pain.





Objective


Active Medications: 





Acetaminophen (Tylenol Tab*)  650 mg PO Q4H PRN


Albuterol/Ipratropium (Duoneb (Albuterol 2.5 Mg/Ipratropium 0.5 Mg))  1 neb INH 

Q4H PRN


Aspirin (Aspirin Low Dose Tab*)  81 mg PO DAILY MARTIN


Atorvastatin Calcium (Lipitor*)  10 mg PO DAILY MARTIN


Cilostazol (Pletal Tab*)  100 mg PO DAILY MARTIN


Device (Tiotropium Inhaler Device*)  1 each .SEE ORDER .USE w/ SPIRIVA CAPS MARTIN


Heparin Sodium (Porcine) (Heparin Vial(*))  5,000 units SUBCUT Q8HR MARTIN


Hydromorphone HCl (Dilaudid Iv*)  1 mg IV SLOW PU Q4H PRN


Cefazolin Sodium 1 gm/ Sodium (Chloride)  50 mls @ 200 mls/hr IVPB Q8H MARTIN


Morphine Sulfate (Morphine Inj (Syringe)*)  2 mg IV Q2H PRN


Omeprazole (Prilosec Cap*)  40 mg PO DAILY MARTIN


Ondansetron HCl (Zofran Inj*)  4 mg IV Q4H PRN


Tiotropium Bromide (Spiriva Cap.Inh*)  1 cap INH DAILY MARTIN


Trazodone HCl (Desyrel Tab*)  150 mg PO BEDTIME PRN





 Vital Signs











  08/17/17 08/17/17 08/17/17





  08:00 08:02 08:35


 


Temperature   


 


Pulse Rate  90 


 


Respiratory 18 14 18





Rate   


 


Blood Pressure   





(mmHg)   


 


O2 Sat by Pulse  92 





Oximetry   














  08/17/17 08/17/17 08/17/17





  09:35 11:26 14:45


 


Temperature  98.9 F 


 


Pulse Rate  102 


 


Respiratory 18 16 18





Rate   


 


Blood Pressure  109/50 





(mmHg)   


 


O2 Sat by Pulse  94 





Oximetry   














  08/17/17 08/17/17 08/17/17





  15:40 15:45 19:27


 


Temperature 98.5 F  98.4 F


 


Pulse Rate 92  89


 


Respiratory 16 18 20





Rate   


 


Blood Pressure 116/55  114/55





(mmHg)   


 


O2 Sat by Pulse 95  96





Oximetry   














  08/17/17 08/17/17 08/17/17





  20:00 23:17 23:49


 


Temperature  98.7 F 


 


Pulse Rate  86 


 


Respiratory 18 17 18





Rate   


 


Blood Pressure  114/49 





(mmHg)   


 


O2 Sat by Pulse  96 





Oximetry   














  08/18/17 08/18/17 08/18/17





  00:49 03:40 04:07


 


Temperature  98.7 F 


 


Pulse Rate  75 


 


Respiratory 16 16 18





Rate   


 


Blood Pressure  122/59 





(mmHg)   


 


O2 Sat by Pulse  98 





Oximetry   














  08/18/17





  05:07


 


Temperature 


 


Pulse Rate 


 


Respiratory 18





Rate 


 


Blood Pressure 





(mmHg) 


 


O2 Sat by Pulse 





Oximetry 











Oxygen Devices in Use Now: None


Appearance: Male sitting up in chair in NAD


Eyes: No Scleral Icterus


Ears/Nose/Mouth/Throat: Mucous Membranes Moist


Neck: Trachea Midline


Respiratory: Symmetrical Chest Expansion and Respiratory Effort, Clear to 

Auscultation


Cardiovascular: NL Sounds; No Murmurs; No JVD


Abdominal: NL Sounds; No Tenderness; No Distention


Lymphatic: No Cervical Adenopathy


Extremities: - - +1 edema, compression ace wraps in place


Skin: No Rash or Ulcers


Neurological: Alert and Oriented x 3, NL Muscle Strength and Tone


Result Diagrams: 


 08/16/17 05:39





 08/16/17 05:39


Additional Lab and Data: 





.


Diagnostic Imaging: 





CTA aorta with runoff - patent R calf anterior and posterior tibial arteries, 

LLE shows intact flow with evidence of atherosclerosis





Echo - EF 40-45% with diastolic dysfunction and dilated IVC





Assess/Plan/Problems-Billing


Assessment: 





Mr. Bowden is a 65 yo gentleman with COPD, thrombocytopenia, PVD, CAD with 

h/o AMI, chronic hyponatremia, alcoholism, factor V leiden who presented with c/

o RLE pain who was admitted for possible cellulitis.





- Patient Problems


(1) Acute pain of right lower extremity


Comment: 


- Patient reports chronic right lower extremity pain, unclear that there has 

been any acute change based on his description today.


- Stop cefazolin, no clear evidence for cellulitis.  Resume routine amoxicillin 

for hx of joint prosthesis infection.


- Arterial studies shows intact flow in the R calf.  Continue pletal for PVD. 


- No evidence of infection to right knee prosthesis, MRI negative, CRP 

essentially normal.


- No evidence of DVT, doppler negative.


- Continue lasix and compression for edema with new weeping noted at PCP visit 

which prompted referral to ED.


- Continue tramadol and oxycodone for now.     





(2) COPD (chronic obstructive pulmonary disease)


Comment: 


- Patient with wheezing that appears to be all upper airway.  Lungs are 

essentially clear to ausculation.


- No evidence of acute exacerbation.


- Cont spiriva and duonebs prn, monitor closely.   





(3) Alcoholism


Comment: 


- No evidence of acute withdrawal.   





(4) CAD (coronary artery disease)


Comment: 


- Asymptomatic.  


- Continue aspirin and atorvastatin.   





(5) Chronic combined systolic and diastolic CHF (congestive heart failure)


Comment: 


- Echo demonstrates EF 40-45%.


- Switch to po lasix.   





(6) Factor V Leiden mutation


Comment: 


- Patient no longer on anticoagulation due to hx of GI bleed.   





(7) Anemia


Comment: 


- Chronic normocytic, but Hgb drifting down during this admission.


- Check stool for occult blood. Check iron, B12, folate.  Suspect secondary to 

chronic alcoholism.  


- Dr. Nicolas's records note iron deficiency anemia, start iron supplementation 

with senna to prevent constipation.   





(8) Hyponatremia


Comment: 


- Asymptomatic, mild, chronic.


- Monitor.   





(9) Infection of prosthetic knee joint


Comment: 


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 

month hospitalization.


- Noted to be on amoxicillin suppressive therapy "for life" per Dr. Nicolas.   





(10) DVT prophylaxis


Comment: 


- SQ heparin   





(11) Full code status





Status and Disposition: 





Inpatient.

## 2017-08-19 NOTE — PN
Subjective


Date of Service: 08/19/17


Interval History: 





Mr. Bowden states that he is feeling relatively well today.  He continues 

to have pain in his right knee but it is better than yesterday.  He is not 

interested in continuing with oxycodone now and would like to go back to his 

tramadol.  He denies chest pain, SOB, nausea, or abdominal pain.





Objective


Active Medications: 





Acetaminophen (Tylenol Tab*)  650 mg PO Q4H PRN


Albuterol/Ipratropium (Duoneb (Albuterol 2.5 Mg/Ipratropium 0.5 Mg))  1 neb INH 

Q4H PRN


Amoxicillin (Amoxicillin Po (*))  500 mg PO TID MARTIN


Aspirin (Aspirin Low Dose Tab*)  81 mg PO DAILY MARTIN


Atorvastatin Calcium (Lipitor*)  10 mg PO DAILY MARTIN


Cilostazol (Pletal Tab*)  100 mg PO DAILY MARTIN


Device (Tiotropium Inhaler Device*)  1 each .SEE ORDER .USE w/ SPIRIVA CAPS MARTIN


Ferrous Sulfate (Ferrous Sulfate Tab*)  325 mg PO DAILY MARTIN


Furosemide (Lasix Tab*)  20 mg PO DAILY MARTIN


Heparin Sodium (Porcine) (Heparin Vial(*))  5,000 units SUBCUT Q8HR MARTIN


Omeprazole (Prilosec Cap*)  40 mg PO DAILY MARTIN


Ondansetron HCl (Zofran Inj*)  4 mg IV Q4H PRN


Oxycodone/Acetaminophen (Percocet 5/325 Tab*)  1 tab PO Q4H PRN


Oxycodone/Acetaminophen (Percocet 5/325 Tab*)  2 tab PO Q4H PRN


Senna (Senokot Tab*)  1 tab PO BEDTIME MARTIN


Tiotropium Bromide (Spiriva Cap.Inh*)  1 cap INH DAILY MARTIN


Trazodone HCl (Desyrel Tab*)  150 mg PO BEDTIME PRN





 Vital Signs











  08/18/17 08/18/17 08/18/17





  15:16 15:28 17:22


 


Temperature 98.3 F  


 


Pulse Rate 94  


 


Respiratory 20 20 18





Rate   


 


Blood Pressure 110/48  





(mmHg)   


 


O2 Sat by Pulse 94  





Oximetry   














  08/18/17 08/18/17 08/18/17





  19:28 19:33 20:00


 


Temperature  98.6 F 


 


Pulse Rate  87 


 


Respiratory 18 16 18





Rate   


 


Blood Pressure  116/54 





(mmHg)   


 


O2 Sat by Pulse  93 





Oximetry   














  08/18/17 08/18/17 08/19/17





  23:24 23:36 01:24


 


Temperature  98.0 F 


 


Pulse Rate  85 


 


Respiratory 16 16 18





Rate   


 


Blood Pressure  117/62 





(mmHg)   


 


O2 Sat by Pulse  95 





Oximetry   














  08/19/17 08/19/17 08/19/17





  03:06 03:37 03:39


 


Temperature  98.0 F 


 


Pulse Rate  81 


 


Respiratory 18 16 16





Rate   


 


Blood Pressure  124/68 





(mmHg)   


 


O2 Sat by Pulse  98 





Oximetry   














  08/19/17 08/19/17 08/19/17





  05:31 07:22 08:00


 


Temperature  98.4 F 


 


Pulse Rate  74 


 


Respiratory 16 16 18





Rate   


 


Blood Pressure  115/60 





(mmHg)   


 


O2 Sat by Pulse  93 





Oximetry   














  08/19/17 08/19/17





  08:06 11:22


 


Temperature  97.9 F


 


Pulse Rate  86


 


Respiratory 18 16





Rate  


 


Blood Pressure  108/60





(mmHg)  


 


O2 Sat by Pulse  93





Oximetry  











Oxygen Devices in Use Now: None


Appearance: Male sitting up in bed in NAD


Eyes: No Scleral Icterus


Ears/Nose/Mouth/Throat: Mucous Membranes Moist


Neck: Trachea Midline


Respiratory: Symmetrical Chest Expansion and Respiratory Effort, Clear to 

Auscultation


Cardiovascular: NL Sounds; No Murmurs; No JVD


Abdominal: NL Sounds; No Tenderness; No Distention


Lymphatic: No Cervical Adenopathy


Extremities: - - R knee contracted, +1 edema with compression stockings on 


Neurological: Alert and Oriented x 3, -


Nutrition: Taking PO's


Result Diagrams: 


 08/16/17 05:39





 08/16/17 05:39


Additional Lab and Data: 





.


Diagnostic Imaging: 





CTA aorta with runoff - patent R calf anterior and posterior tibial arteries, 

LLE shows intact flow with evidence of atherosclerosis





Echo - EF 40-45% with diastolic dysfunction and dilated IVC





Assess/Plan/Problems-Billing


Assessment: 





Mr. Bowden is a 67 yo gentleman with COPD, thrombocytopenia, PVD, CAD with 

h/o AMI, chronic hyponatremia, alcoholism, factor V leiden who presented with c/

o RLE pain who was admitted for possible cellulitis.





- Patient Problems


(1) Acute pain of right lower extremity


Comment: 


- Patient reports chronic right lower extremity pain, unclear that there has 

been any acute change based on his description.


- Stop cefazolin, no clear evidence for cellulitis.  Resume routine amoxicillin 

for hx of joint prosthesis infection.


- Arterial studies shows intact flow in the R calf.  Continue pletal for PVD. 


- No evidence of infection to right knee prosthesis, MRI negative, CRP 

essentially normal.


- No evidence of DVT, doppler negative.


- Continue lasix and compression for edema with new weeping noted at PCP visit 

which prompted referral to ED.


- Continue tramadol and oxycodone for now.     





(2) COPD (chronic obstructive pulmonary disease)


Comment: 


- Patient with wheezing that appears to be all upper airway.  Lungs are 

essentially clear to ausculation.


- No evidence of acute exacerbation.


- Cont spiriva and duonebs prn, monitor closely.   





(3) Alcoholism


Comment: 


- No evidence of acute withdrawal.   





(4) CAD (coronary artery disease)


Comment: 


- Asymptomatic.  


- Continue aspirin and atorvastatin.   





(5) Chronic combined systolic and diastolic CHF (congestive heart failure)


Comment: 


- Echo demonstrates EF 40-45%.


- Switch to po lasix.   





(6) Factor V Leiden mutation


Comment: 


- Patient no longer on anticoagulation due to hx of GI bleed.   





(7) Anemia


Comment: 


- Chronic normocytic, but Hgb drifting down during this admission.


- Check stool for occult blood. Check iron, B12, folate.  Suspect secondary to 

chronic alcoholism.  


- Dr. Nicolas's records note iron deficiency anemia, start iron supplementation 

with senna to prevent constipation.   





(8) Hyponatremia


Comment: 


- Asymptomatic, mild, chronic.


- Monitor.   





(9) Infection of prosthetic knee joint


Comment: 


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 

month hospitalization.


- Noted to be on amoxicillin suppressive therapy "for life" per Dr. Nicolas.   





(10) DVT prophylaxis


Comment: 


- SQ heparin   





(11) Full code status





Status and Disposition: 





Inpatient. Patient from home, concern for safety of living situation with son.  

 following.

## 2017-08-20 NOTE — DS
CC:  Dr. Nicolas *

 

HOSPITAL MEDICINE DISCHARGE SUMMARY:

 

DATE OF ADMISSION:  08/14/17

 

DATE OF DISCHARGE:  08/20/17

 

PRIMARY CARE PHYSICIAN:  Dr. Nicolas.

 

ATTENDING PHYSICIAN:  Dr. Tomasz Leroy * (dictation provided by Suzanne Mcclendon NP
)

 

PRIMARY DIAGNOSES:

1.  Acute on chronic right lower extremity pain.

2.  Acute on chronic congestive heart failure exacerbation with lower extremity 
edema.

 

SECONDARY DIAGNOSES:

1.  Chronic obstructive pulmonary disease.

2.  History of alcoholism.

3.  History of coronary artery disease, history of myocardial infarction.

4.  History of chronic combined systolic and diastolic congestive heart failure.

5.  History of factor V Leiden mutation, not on anticoagulation due to 
gastrointestinal bleed.

6.  Chronic iron deficiency anemia.

7.  Chronic hyponatremia, mild.

8.  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 
since 2014.

9.  Chronic thrombocytopenia.

10.  Peripheral vascular disease.

11.  History of gastrointestinal bleed.

 

PAST SURGICAL HISTORY:

1.  Distal femur replacement.

2.  Right hip ORIF.

3.  Right hip replacement.

4.  Hernia repair.

5.  Ear surgery.

 

MEDICATIONS:

1.  Spiriva 1 cap inhaled q.a.m.

2.  Atorvastatin 10 mg p.o. daily.

3.  Cyanocobalamin 1000 mg p.o. daily.

4.  Pletal 100 mg p.o. daily.

5.  Amoxicillin 500 mg p.o. t.i.d.

6.  Tylenol 650 mg p.o. q.4 hours p.r.n.

7.  Vitamin D3 1000 units p.o. daily.

8.  Multivitamin with minerals 1 tab p.o. daily.

9.  Tramadol 100 mg p.o. q.6 hours p.r.n. pain.

10.  Omeprazole 40 mg p.o. daily.

11.  Trazodone 150 mg p.o. at bedtime p.r.n.

12.  Senna 1 tab p.o. at bedtime.

13.  Polyethylene glycol 17 g p.o. daily p.r.n. constipation.

14.  Furosemide 20 mg p.o. daily.

15.  Ferrous sulfate 325 mg p.o. daily.

 

I called the patient's pharmacy to determine if he has been on Dilantin, per 
them he has not picked that up since April.  At this point, I am not continuing 
it until he follows up with Dr. Nicolas to discuss its indications and necessity.

 

HOSPITAL COURSE:  Mr. Bowden is a 66-year-old male with a past medical 
history of coronary artery disease with MI, chronic alcoholism, chronic 
nicotine use with COPD, and contracted right knee with history of infected 
right knee hardware, as well as combined diastolic and systolic CHF, who 
presented to the hospital on 08/14/17, with concern for shortness of breath and 
drainage from the legs noted at primary care physician's office.  Please see 
dictated H and P from Dr. Jeff Ruggiero for complete details.  In brief, I spoke 
with Dr. Nicolas on the phone, she indicated that the patient appeared short of 
breath at rest in her office.  He also had significant drainage from his legs 
and they were both very swollen.  She, therefore, requested that he come to the 
emergency room for evaluation.  In the emergency room, the patient had labs 
that were essentially unremarkable just reflecting his chronic anemia and 
chronic hyponatremia.  He had no evidence of infection with a normal white 
blood cell count and he was afebrile and his vital signs are stable.  He had a 
venous Doppler study of the right lower extremity, which showed no evidence for 
DVT.  He had a chest x-ray that showed small bibasilar infiltrates only, 
although this was not interpreted to reflect a true pneumonia based on paucity 
of other evidence to support that.  Initially, it was felt that Mr. Bowden 
had cellulitis and he was admitted for treatment of that with vancomycin and 
Zosyn.

 

Mr. Bowden had a transthoracic echocardiogram on 08/15/17, which showed 
persistent estimated ejection fraction of 40% to 45% with abnormal diastolic 
filling observed.  It showed a new moderate pulmonary hypertension.  The 
patient also had an aorta with run-off CTA, which showed "_____ hardware is 
noted in the right lower extremity, the calf vessels demonstrate patent 
posterior tibial and anterior tibial arteries.  The left lower extremity 
demonstrates 3-vessel run off with atherosclerosis throughout.  The aorta and 
iliac arteries demonstrate atherosclerosis."

 

Though it was initially felt that Mr. Bowden had cellulitis, as his 
hospitalization unfolded, it was felt more likely that the patient had lower 
extremity edema related to his CHF without clear evidence of infection.  He had 
been switched over to cefazolin and this was ultimately discontinued.  He has 
done well with Lasix therapy and compression wraps to both legs.  He now has 
significantly decreased edema and no further weeping noted.  There is no 
erythema or purulent drainage to suggest infection.

 

Because of the patient's history of infected hardware to his right knee back in 
2014, he did undergo a lower extremity MRI on 08/17/17.  It showed "limited 
study, no gross evidence for osteomyelitis or abscess." It was not felt that 
there was any evidence that the extremity was newly infected and the plan was 
to continue the patient's routine amoxicillin.

 

Mr. Bowden has severe contractures to the right knee, and therefore, he 
has to sit up in the chair at all times due to the fact that he feels very 
uncomfortable lying in bed.  I considered initiating narcotic therapy regimen 
for his pain so he could have greater mobility; however, on discussing this 
with Dr. Nicolas, she noted that the patient was frequently lost to follow up 
and due to his history of alcoholism and limited support system that narcotics 
might not be the best option. I spoke with Mr. Bowden about this as well 
and he is agreeing to continue on his tramadol therapy.  I will note that he 
certainly remains uncomfortable and has limited mobility and that he would 
benefit from greater analgesia if he comes into a situation where he has 
greater stability in his home life.

 

Mr. Bowden is doing well today.  Plans are for him to be discharged to 
home to follow up closely with Dr. Nicolas in her office, an appointment will be 
made for him.  He is also noted to be following up with the wound clinic for 
open area on his leg.

 

DISPOSITION:  Home.

 

DIET:  Low salt.

 

ACTIVITY:  As tolerated.

 

FOLLOWUP PLANS:  Please follow up with Dr. Nicolas, an appointment will be made 
in the next 1 to 2 weeks.

 

TIME SPENT:  Approximately 60 minutes was spent in the discharge of this patient
, more than half the time spent with the patient at the bedside reviewing the 
events leading up to this hospitalization, performing the physical examination, 
and reviewing my plan of care.

 

____________________________________ SUZANNE MCCLENDON NP

 

[id number]/CPS #: [id number]

MTDD

## 2017-08-20 NOTE — PN
Subjective


Date of Service: 08/20/17


Interval History: 





Mr. Bowden is eager for discharge to home.  He continues to complain of 

chronic right knee pain.  He denies chest pain, SOB, nausea, or abdominal pain.











Objective


Active Medications: 





Acetaminophen (Tylenol Tab*)  650 mg PO Q4H PRN


Albuterol/Ipratropium (Duoneb (Albuterol 2.5 Mg/Ipratropium 0.5 Mg))  1 neb INH 

Q4H PRN


Amoxicillin (Amoxicillin Po (*))  500 mg PO TID MARTIN


Aspirin (Aspirin Low Dose Tab*)  81 mg PO DAILY [facility]IN


Atorvastatin Calcium (Lipitor*)  10 mg PO DAILY [facility]IN


Cilostazol (Pletal Tab*)  100 mg PO DAILY MARTIN


Device (Tiotropium Inhaler Device*)  1 each .SEE ORDER .USE w/ SPIRIVA CAPS [facility]IN


Ferrous Sulfate (Ferrous Sulfate Tab*)  325 mg PO DAILY [facility]IN


Furosemide (Lasix Tab*)  20 mg PO DAILY [facility]IN


Heparin Sodium (Porcine) (Heparin Vial(*))  5,000 units SUBCUT Q8HR [facility]IN


Omeprazole (Prilosec Cap*)  40 mg PO DAILY [facility]IN


Ondansetron HCl (Zofran Inj*)  4 mg IV Q4H PRN


Senna (Senokot Tab*)  1 tab PO BEDTIME MARTIN


Tiotropium Bromide (Spiriva Cap.Inh*)  1 cap INH DAILY [facility]ARTIN


Tramadol HCl (Ultram*)  100 mg PO Q6HR PRN


Trazodone HCl (Desyrel Tab*)  150 mg PO BEDTIME PRN





 Vital Signs











  08/19/17 08/19/17 08/19/17





  10:06 11:22 15:05


 


Temperature  97.9 F 


 


Pulse Rate  86 


 


Respiratory 16 16 16





Rate   


 


Blood Pressure  108/60 





(mmHg)   


 


O2 Sat by Pulse  93 





Oximetry   














  08/19/17 08/19/17 08/19/17





  15:17 17:05 20:00


 


Temperature 98.0 F  


 


Pulse Rate 91  


 


Respiratory 16 18 16





Rate   


 


Blood Pressure 122/63  





(mmHg)   


 


O2 Sat by Pulse 97  





Oximetry   














  08/19/17 08/20/17 08/20/17





  20:01 00:13 04:00


 


Temperature 98.4 F 98.6 F 98.4 F


 


Pulse Rate 87 79 82


 


Respiratory 16 16 18





Rate   


 


Blood Pressure 128/50 111/55 126/59





(mmHg)   


 


O2 Sat by Pulse 95 94 94





Oximetry   














  08/20/17 08/20/17 08/20/17





  04:10 06:10 07:35


 


Temperature   98.9 F


 


Pulse Rate   66


 


Respiratory 20 16 22





Rate   


 


Blood Pressure   114/59





(mmHg)   


 


O2 Sat by Pulse   96





Oximetry   














  08/20/17 08/20/17 08/20/17





  07:59 08:00 09:21


 


Temperature   


 


Pulse Rate 68  


 


Respiratory 14 22 16





Rate   


 


Blood Pressure   





(mmHg)   


 


O2 Sat by Pulse 92  





Oximetry   











Oxygen Devices in Use Now: None


Appearance: Male sitting up in chair in NAD


Eyes: No Scleral Icterus


Ears/Nose/Mouth/Throat: Mucous Membranes Moist


Neck: Trachea Midline


Respiratory: Symmetrical Chest Expansion and Respiratory Effort, Clear to 

Auscultation


Cardiovascular: NL Sounds; No Murmurs; No JVD


Abdominal: NL Sounds; No Tenderness; No Distention


Lymphatic: No Cervical Adenopathy


Extremities: - - Improved edema to B LEs, no further weeping noted today


Skin: No Rash or Ulcers


Neurological: Alert and Oriented x 3, NL Muscle Strength and Tone, - - Right 

knee contracted


Nutrition: Taking PO's


Result Diagrams: 


 08/16/17 05:39





 08/16/17 05:39


Additional Lab and Data: 





.


Microbiology and Other Data: 


 Microbiology











 08/18/17 14:30 Stool Occult Blood ([facility]) - Final





 Stool 











Diagnostic Imaging: 





CTA aorta with runoff - patent R calf anterior and posterior tibial arteries, 

LLE shows intact flow with evidence of atherosclerosis





Echo - EF 40-45% with diastolic dysfunction and dilated IVC





Assess/Plan/Problems-Billing


Assessment: 





Mr. Bowden is a 67 yo gentleman with COPD, thrombocytopenia, PVD, CAD with 

h/o AMI, chronic hyponatremia, alcoholism, factor V leiden who presented with c/

o RLE pain who was admitted for possible cellulitis.





- Patient Problems


(1) Acute pain of right lower extremity


Comment: 


- Patient reports chronic right lower extremity pain, unclear that there has 

been any acute change based on his description.


- Stop cefazolin, no clear evidence for cellulitis.  Resume routine amoxicillin 

for hx of joint prosthesis infection.


- Arterial studies shows intact flow in the R calf.  Continue pletal for PVD. 


- No evidence of infection to right knee prosthesis, MRI negative, CRP 

essentially normal.


- No evidence of DVT, doppler negative.


- Continue lasix and compression for edema with new weeping noted at PCP visit 

which prompted referral to ED.


- Continue tramadol for pain, patient not a good candidate for opiate therapy 

as PCP notes that he is often lost to follow up outpatient.     





(2) COPD (chronic obstructive pulmonary disease)


Comment: 


- Patient with wheezing that appears to be all upper airway.  Lungs are 

essentially clear to ausculation.


- No evidence of acute exacerbation.


- Cont spiriva and duonebs prn, monitor closely.   





(3) Alcoholism


Comment: 


- No evidence of acute withdrawal.   





(4) CAD (coronary artery disease)


Comment: 


- Asymptomatic.  


- Continue aspirin and atorvastatin.   





(5) Chronic combined systolic and diastolic CHF (congestive heart failure)


Comment: 


- Echo demonstrates EF 40-45%.


- Switch to po lasix.   





(6) Factor V Leiden mutation


Comment: 


- Patient no longer on anticoagulation due to hx of GI bleed.   





(7) Anemia


Comment: 


- Chronic normocytic, but Hgb drifting down during this admission.


- Stool for occult blood negative. Suspect secondary to chronic alcoholism.  


- Dr. Nicolas's records note iron deficiency anemia, start iron supplementation 

with senna to prevent constipation.   





(8) Hyponatremia


Comment: 


- Asymptomatic, mild, chronic.


- Monitor.   





(9) Infection of prosthetic knee joint


Comment: 


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 

month hospitalization.


- Noted to be on amoxicillin suppressive therapy "for life" per Dr. Nicolas.   





(10) DVT prophylaxis


Comment: 


- SQ heparin   





(11) Full code status





Status and Disposition: 





Inpatient. Reviewed discharge plan with  and patient's family.  

Patient appears to be safe at home at this point with support from his son.  

Discharge to home.

## 2018-02-05 NOTE — ED
Jaylon KATE Jennifer, scribed for Abhay Roberts MD on 02/05/18 at 1212 .





Altered Mental Status





- HPI Summary


HPI Summary: 





The patient is a 67 year old male who was brought into the ED with AMS today. 

The patient was shaking uncontrollably and unresponsive today when his son went 

to visit him. The patient lives with his brother in a poorly kept house that 

smells of feces. He was confused, immobile, his right leg was swollen, and he 

had redness in his lower abdomen. The son reports he did not observe any of 

these symptoms when he visited his father last week. The son also states that 

the patient has his heart and artery in his stomach and has blood flow problems 

in the legs. The patient was told he could lose both legs if he didnt stop 

smoking, but he never stopped smoking. 





LEVEL 5 CAVEAT: HPI limited due to AMS.





- History Of Current Complaint


Chief Complaint: EDAltMentalStatus


Stated Complaint: ABD PAIN


Time Seen by Provider: 02/05/18 11:49


Hx Obtained From: Family/Caretaker, EMS


Onset/Duration: Still Present


Severity Currently: Severe





- Allergies/Home Medications


Allergies/Adverse Reactions: 


 Allergies











Allergy/AdvReac Type Severity Reaction Status Date / Time


 


fentanyl Allergy  Itching Verified 02/05/18 12:19











Home Medications: 


 Home Medications





Cholecalciferol TAB* [Vitamin D TAB*] 1,000 unit PO DAILY 02/05/18 [History 

Confirmed 02/05/18]


Cyanocobalamin TAB* [Vitamin B12 TAB*] 1,000 mcg PO DAILY 02/05/18 [History 

Confirmed 02/05/18]


Hydrocortisone 2.5% CREAM(NF) 1 applic TOPICAL BID 02/05/18 [History Confirmed 

02/05/18]











PMH/Surg Hx/FS Hx/Imm Hx


Endocrine/Hematology History: Reports: Hx Blood Disorders - factor 5 clotting 

disorder


   Denies: Hx Anticoagulant Therapy, Hx Diabetes, Hx Thyroid Disease


Cardiovascular History: Reports: Hx Angina, Hx Coronary Artery Disease, Hx Deep 

Vein Thrombosis, Hx Myocardial Infarction, Hx Peripheral Vascular Disease, 

Other Cardiovascular Problems/Disorders - fACTOR V LEIDEN CLOTTING DISORDER


   Denies: Hx Cardiomegaly, Hx Congestive Heart Failure, Hx Hypertension, Hx 

Pacemaker/ICD, Hx Rheumatic Fever, Hx Valvular Heart Disease


Respiratory History: Reports: Hx Asthma, Hx Chronic Obstructive Pulmonary 

Disease (COPD), Other Respiratory Problems/Disorders - Longterm smoker


   Denies: Hx Pulmonary Edema, Hx Pulmonary Embolism


GI History: Reports: Hx Gastroesophageal Reflux Disease, Other GI Disorders - 

HX OF GI BLEED - NO PROBLEMS NOW


   Denies: Hx Cirrhosis, Hx Crohn's Disease, Hx Hiatal Hernia, Hx Irritable 

Bowel, Hx Jaundice, Hx Ulcer


 History: 


   Denies: Hx Renal Disease, Other  Problems/Disorders


Musculoskeletal History: Reports: Hx Arthritis, Hx Back Problems, Hx 

Osteoporosis, Other Musculoskeletal History - Fx hip sp fall at home hx


   Denies: Hx Rheumatoid Arthritis, Hx Bursitis


Sensory History: Reports: Hx Cataracts - HAVING CATARACT SURGERY, Hx Contacts 

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


   Denies: Hx Hearing Aid


Opthamlomology History: Reports: Hx Cataracts - HAVING CATARACT SURGERY, Hx 

Contacts or Glasses - for reading


Neurological History: 


   Denies: Hx Headaches, Hx Migraine, Hx Seizures


   Comment Only: Other Neuro Impairments/Disorders - NEUROPATHY/HX SUBSTANCE 

ABUSE


Psychiatric History: Reports: Hx Depression, Hx Substance Abuse


   Denies: Hx Anxiety, Hx Panic Disorder





- Cancer History


Hx Chemotherapy: No





- Surgical History


Surgery Procedure, Year, and Place[place] [facility] [place]TH[place] 

April 2014; Right total knee replacement june 5 2014, [facility].  right ear surgery, 

HERNIA REPAIR


Hx Anesthesia Reactions: No





- Immunization History


Date of Tetanus Vaccine: 2011


Date of Influenza Vaccine: None


Infectious Disease History: Reports: Hx of Known/Suspected MRSA


   Denies: Hx Hepatitis





- Family History


Known Family History: Positive: Cardiac Disease, Other - CANCER





- Social History


Alcohol Use: Weekly


Alcohol Amount: NOT SINCE NEW YEARS


Substance Use Type: Reports: None


Hx Tobacco Use: Yes


Smoking Status (MU): Current Every Day Smoker


Type: Cigarettes


Amount Used/How Often: 1 - 2 PPD


Length of Time of Smoking/Using Tobacco: 50 YEARS


Have You Smoked in the Last Year: Yes





Review of Systems





- ROS Summary


Review of Systems Summary: 





LEVEL 5 CAVET


Positive: Other - Right leg swollen, redness of lower abdomen


Positive: Other - Confused, unresponsive


All Other Systems Reviewed And Are Negative: No





- Comments


Additional Review of Systems Comments: 





LEVEL 5 CAVEAT: ROS limited due to AMS.





Physical Exam





- Summary


Physical Exam Summary: 





General: awake, responds to voice.


Skin: warm, color reflects adequate perfusion, dry


Head: normal


Eyes: EOMI, PHIL


ENT: normal


Neck: supple, nontender


Respiratory: CTA, breath sounds present


Cardiovascular: No pulse is palpated. Capillary refill is poor.


Abdomen: soft, nontender


Bowel: present


Musculoskeletal: Right leg, from the thigh up to the abdomen, is erythematous 

and swollen. Right foot is dusky.


Neurological: sensory/motor intact





LEVEL 5 CAVEAT: Physical Exam limited due to AMS.


Triage Information Reviewed: Yes


Vital Signs On Initial Exam: 


 Initial Vitals











Temp Pulse Resp BP Pulse Ox


 


 96.1 F   97   26   156/102   92 


 


 02/05/18 12:00  02/05/18 12:00  02/05/18 12:00  02/05/18 12:00  02/05/18 12:00











Vital Signs Reviewed: Yes





- [place] Coma Scale


Best Eye Response: 4 - Spontaneous


Best Motor Response: 5 - Purposeful Movement


Best Verbal Response: 4 - Confused


Coma Scale Total: 13





Diagnostics





- Vital Signs


 Vital Signs











  Temp Pulse Resp BP Pulse Ox


 


 02/05/18 15:34  97.5 F  109  28  158/97  96


 


 02/05/18 14:30   109  22   94


 


 02/05/18 14:20   103  26  119/73  96


 


 02/05/18 14:10   100  28  110/73  99


 


 02/05/18 14:08     104/80 


 


 02/05/18 14:03   101  29  79/48  90


 


 02/05/18 14:00   90  21  71/47  90


 


 02/05/18 13:51   52  21  91/73  80


 


 02/05/18 13:50    22  


 


 02/05/18 13:40   101  22   98


 


 02/05/18 13:30    27  107/90 


 


 02/05/18 13:20    19  125/72 


 


 02/05/18 13:10   95  25  110/68  97


 


 02/05/18 13:08   94  25  104/51  97


 


 02/05/18 13:00   95  25  108/62  97


 


 02/05/18 12:55      98


 


 02/05/18 12:50    25  


 


 02/05/18 12:32   91  26   94


 


 02/05/18 12:30     109/65 


 


 02/05/18 12:00  96.1 F  97  26  156/102  92














- Laboratory


Lab Results: 


 Lab Results











  02/05/18 02/05/18 02/05/18 Range/Units





  12:10 12:10 12:10 


 


WBC     (3.5-10.8)  10^3/ul


 


RBC     (4.0-5.4)  10^6/ul


 


Hgb     (14.0-18.0)  g/dl


 


Hct     (42-52)  %


 


MCV     (80-94)  fL


 


MCH     (27-31)  pg


 


MCHC     (31-36)  g/dl


 


RDW     (10.5-15)  %


 


Plt Count     (150-450)  10^3/ul


 


MPV     (7.4-10.4)  um3


 


Neut % (Auto)     (38-83)  %


 


Lymph % (Auto)     (25-47)  %


 


Mono % (Auto)     (1-9)  %


 


Eos % (Auto)     (0-6)  %


 


Baso % (Auto)     (0-2)  %


 


Absolute Neuts (auto)     (1.5-7.7)  10^3/ul


 


Absolute Lymphs (auto)     (1.0-4.8)  10^3/ul


 


Absolute Monos (auto)     (0-0.8)  10^3/ul


 


Absolute Eos (auto)     (0-0.6)  10^3/ul


 


Absolute Basos (auto)     (0-0.2)  10^3/ul


 


Absolute Nucleated RBC     10^3/ul


 


Nucleated RBC %     


 


INR (Anticoag Therapy)     (0.77-1.02)  


 


APTT     (26.0-36.3)  seconds


 


VBG pH     (7.33-7.43)  


 


VBG pCO2     (41-51)  mmHg


 


VBG pO2     (35-45)  mmHg


 


VBG HCO3     (24-28)  mmol/L


 


VBG O2 Saturation     (70-80)  %


 


VBG Base Excess     (0-4)  


 


Sodium  107 L*    (133-145)  mmol/L


 


Potassium  4.3    (3.5-5.0)  mmol/L


 


Chloride  72 L    (101-111)  mmol/L


 


Carbon Dioxide  25    (22-32)  mmol/L


 


Anion Gap  10    (2-11)  mmol/L


 


BUN  8    (6-24)  mg/dL


 


Creatinine  0.45 L    (0.67-1.17)  mg/dL


 


Est GFR ( Amer)  240.2    (>60)  


 


Est GFR (Non-Af Amer)  186.7    (>60)  


 


BUN/Creatinine Ratio  17.8    (8-20)  


 


Glucose  81    ()  mg/dL


 


Lactic Acid     (0.5-2.0)  mmol/L


 


Calcium  7.6 L    (8.6-10.3)  mg/dL


 


Total Bilirubin  0.70    (0.2-1.0)  mg/dL


 


AST  52 H    (13-39)  U/L


 


ALT  27    (7-52)  U/L


 


Alkaline Phosphatase  115 H    ()  U/L


 


Troponin I  0.05 H*    (<0.04)  ng/mL


 


C-Reactive Protein  147.80 H    (< 5.00)  mg/L


 


B-Natriuretic Peptide   208 H  ( - 100) pg/mL


 


Total Protein  6.3 L    (6.4-8.9)  g/dL


 


Albumin  2.6 L    (3.2-5.2)  g/dL


 


Globulin  3.7    (2-4)  g/dL


 


Albumin/Globulin Ratio  0.7 L    (1-3)  


 


Lipase  < 10 L    (11.0-82.0)  U/L


 


Procalcitonin    0.3  (<0.6)  ng/mL


 


Urine Color     


 


Urine Appearance     


 


Urine pH     (5-9)  


 


Ur Specific Gravity     (1.010-1.030)  


 


Urine Protein     (Negative)  


 


Urine Ketones     (Negative)  


 


Urine Blood     (Negative)  


 


Urine Nitrate     (Negative)  


 


Urine Bilirubin     (Negative)  


 


Urine Urobilinogen     (Negative)  


 


Ur Leukocyte Esterase     (Negative)  


 


Urine WBC (Auto)     (Absent)  


 


Urine RBC (Auto)     (Absent)  


 


Urine Bacteria     (Absent)  


 


Urine Glucose     (Negative)  


 


Blood Type     


 


Antibody Screen     














  02/05/18 02/05/18 02/05/18 Range/Units





  12:10 12:10 12:10 


 


WBC    31.1 H  (3.5-10.8)  10^3/ul


 


RBC    4.36  (4.0-5.4)  10^6/ul


 


Hgb    11.5 L  (14.0-18.0)  g/dl


 


Hct    35 L  (42-52)  %


 


MCV    81  (80-94)  fL


 


MCH    27  (27-31)  pg


 


MCHC    33  (31-36)  g/dl


 


RDW    22 H  (10.5-15)  %


 


Plt Count    379  (150-450)  10^3/ul


 


MPV    8  (7.4-10.4)  um3


 


Neut % (Auto)    93.5 H  (38-83)  %


 


Lymph % (Auto)    2.1 L  (25-47)  %


 


Mono % (Auto)    3.2  (1-9)  %


 


Eos % (Auto)    0.1  (0-6)  %


 


Baso % (Auto)    1.1  (0-2)  %


 


Absolute Neuts (auto)    29.1 H  (1.5-7.7)  10^3/ul


 


Absolute Lymphs (auto)    0.7 L  (1.0-4.8)  10^3/ul


 


Absolute Monos (auto)    1.0 H  (0-0.8)  10^3/ul


 


Absolute Eos (auto)    0  (0-0.6)  10^3/ul


 


Absolute Basos (auto)    0.3 H  (0-0.2)  10^3/ul


 


Absolute Nucleated RBC    0  10^3/ul


 


Nucleated RBC %    0.1  


 


INR (Anticoag Therapy)   1.67 H   (0.77-1.02)  


 


APTT   41.4 H   (26.0-36.3)  seconds


 


VBG pH     (7.33-7.43)  


 


VBG pCO2     (41-51)  mmHg


 


VBG pO2     (35-45)  mmHg


 


VBG HCO3     (24-28)  mmol/L


 


VBG O2 Saturation     (70-80)  %


 


VBG Base Excess     (0-4)  


 


Sodium     (133-145)  mmol/L


 


Potassium     (3.5-5.0)  mmol/L


 


Chloride     (101-111)  mmol/L


 


Carbon Dioxide     (22-32)  mmol/L


 


Anion Gap     (2-11)  mmol/L


 


BUN     (6-24)  mg/dL


 


Creatinine     (0.67-1.17)  mg/dL


 


Est GFR ( Amer)     (>60)  


 


Est GFR (Non-Af Amer)     (>60)  


 


BUN/Creatinine Ratio     (8-20)  


 


Glucose     ()  mg/dL


 


Lactic Acid     (0.5-2.0)  mmol/L


 


Calcium     (8.6-10.3)  mg/dL


 


Total Bilirubin     (0.2-1.0)  mg/dL


 


AST     (13-39)  U/L


 


ALT     (7-52)  U/L


 


Alkaline Phosphatase     ()  U/L


 


Troponin I     (<0.04)  ng/mL


 


C-Reactive Protein     (< 5.00)  mg/L


 


B-Natriuretic Peptide    ( - 100) pg/mL


 


Total Protein     (6.4-8.9)  g/dL


 


Albumin     (3.2-5.2)  g/dL


 


Globulin     (2-4)  g/dL


 


Albumin/Globulin Ratio     (1-3)  


 


Lipase     (11.0-82.0)  U/L


 


Procalcitonin     (<0.6)  ng/mL


 


Urine Color     


 


Urine Appearance     


 


Urine pH     (5-9)  


 


Ur Specific Gravity     (1.010-1.030)  


 


Urine Protein     (Negative)  


 


Urine Ketones     (Negative)  


 


Urine Blood     (Negative)  


 


Urine Nitrate     (Negative)  


 


Urine Bilirubin     (Negative)  


 


Urine Urobilinogen     (Negative)  


 


Ur Leukocyte Esterase     (Negative)  


 


Urine WBC (Auto)     (Absent)  


 


Urine RBC (Auto)     (Absent)  


 


Urine Bacteria     (Absent)  


 


Urine Glucose     (Negative)  


 


Blood Type  O Positive    


 


Antibody Screen  Negative    














  02/05/18 02/05/18 02/05/18 Range/Units





  12:10 12:20 12:25 


 


WBC     (3.5-10.8)  10^3/ul


 


RBC     (4.0-5.4)  10^6/ul


 


Hgb     (14.0-18.0)  g/dl


 


Hct     (42-52)  %


 


MCV     (80-94)  fL


 


MCH     (27-31)  pg


 


MCHC     (31-36)  g/dl


 


RDW     (10.5-15)  %


 


Plt Count     (150-450)  10^3/ul


 


MPV     (7.4-10.4)  um3


 


Neut % (Auto)     (38-83)  %


 


Lymph % (Auto)     (25-47)  %


 


Mono % (Auto)     (1-9)  %


 


Eos % (Auto)     (0-6)  %


 


Baso % (Auto)     (0-2)  %


 


Absolute Neuts (auto)     (1.5-7.7)  10^3/ul


 


Absolute Lymphs (auto)     (1.0-4.8)  10^3/ul


 


Absolute Monos (auto)     (0-0.8)  10^3/ul


 


Absolute Eos (auto)     (0-0.6)  10^3/ul


 


Absolute Basos (auto)     (0-0.2)  10^3/ul


 


Absolute Nucleated RBC     10^3/ul


 


Nucleated RBC %     


 


INR (Anticoag Therapy)     (0.77-1.02)  


 


APTT     (26.0-36.3)  seconds


 


VBG pH    7.45 H  (7.33-7.43)  


 


VBG pCO2    36 L  (41-51)  mmHg


 


VBG pO2    19 L  (35-45)  mmHg


 


VBG HCO3    24.3  (24-28)  mmol/L


 


VBG O2 Saturation    32.2 L  (70-80)  %


 


VBG Base Excess    1.2  (0-4)  


 


Sodium     (133-145)  mmol/L


 


Potassium     (3.5-5.0)  mmol/L


 


Chloride     (101-111)  mmol/L


 


Carbon Dioxide     (22-32)  mmol/L


 


Anion Gap     (2-11)  mmol/L


 


BUN     (6-24)  mg/dL


 


Creatinine     (0.67-1.17)  mg/dL


 


Est GFR ( Amer)     (>60)  


 


Est GFR (Non-Af Amer)     (>60)  


 


BUN/Creatinine Ratio     (8-20)  


 


Glucose     ()  mg/dL


 


Lactic Acid  3.9 H*    (0.5-2.0)  mmol/L


 


Calcium     (8.6-10.3)  mg/dL


 


Total Bilirubin     (0.2-1.0)  mg/dL


 


AST     (13-39)  U/L


 


ALT     (7-52)  U/L


 


Alkaline Phosphatase     ()  U/L


 


Troponin I     (<0.04)  ng/mL


 


C-Reactive Protein     (< 5.00)  mg/L


 


B-Natriuretic Peptide    ( - 100) pg/mL


 


Total Protein     (6.4-8.9)  g/dL


 


Albumin     (3.2-5.2)  g/dL


 


Globulin     (2-4)  g/dL


 


Albumin/Globulin Ratio     (1-3)  


 


Lipase     (11.0-82.0)  U/L


 


Procalcitonin     (<0.6)  ng/mL


 


Urine Color   Joy   


 


Urine Appearance   Clear   


 


Urine pH   5.0   (5-9)  


 


Ur Specific Gravity   1.017   (1.010-1.030)  


 


Urine Protein   1+(30 mg/dl) H   (Negative)  


 


Urine Ketones   Trace H   (Negative)  


 


Urine Blood   1+ H   (Negative)  


 


Urine Nitrate   Negative   (Negative)  


 


Urine Bilirubin   Negative   (Negative)  


 


Urine Urobilinogen   Negative   (Negative)  


 


Ur Leukocyte Esterase   Negative   (Negative)  


 


Urine WBC (Auto)   Trace(0-5/hpf)   (Absent)  


 


Urine RBC (Auto)   Trace(0-2/hpf)   (Absent)  


 


Urine Bacteria   Absent   (Absent)  


 


Urine Glucose   Negative   (Negative)  


 


Blood Type     


 


Antibody Screen     











Result Diagrams: 


 02/05/18 12:10





 02/05/18 12:10


Lab Statement: Any lab studies that have been ordered have been reviewed, and 

results considered in the medical decision making process.





- Radiology


  ** CXR


Xray Interpretation: Positive (See Comments) - 1.Chronic RIGHT lower lobe 

volume loss with partial atelectasis. Small dependent RIGHT pleural effusion 

not excluded. 2. No evidence for pulmonary edema. Dr. Roberts has reviewed 

this report.


Radiology Interpretation Completed By: Radiologist





- CT


  ** CT Brain


CT Interpretation: No Acute Changes - NO ACUTE INTRACRANIAL PATHOLOGY. Dr. Roberts has reviewed this report.


CT Interpretation Completed By: Radiologist





  ** CT Abd/Pel


CT Interpretation: Positive (See Comments) - 1.  RIGHT INGUINAL 

LYMPHADENOPATHY. 2.  THERE IS SUBCUTANEOUS EDEMA ALONG THE PROXIMAL RIGHT LOWER 

EXTREMITY.  3.  ATHEROSCLEROSIS. 4.  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 

COMPRESSION FRACTURES, WITH PROGRESSION AT L2 TO COMPARED TO JULY 20, 2014. 

THERE IS NO SIGNIFICANT OSSEOUS RETROPULSION. Dr. Roberts has reviewed this 

report.


CT Interpretation Completed By: Radiologist





  ** CT Lower Extremities


CT Interpretation: Positive (See Comments) - 1. THERE IS A DISPLACED ANGULATED 

PERIPROSTHETIC FRACTURE PRESENT IN THE MID DIAPHYSIS OF THE RIGHT FEMUR LOCATED 

BETWEEN THE STEMS OF THE TOTAL HIP AND TOTAL KNEE PROSTHESES. 2. FINDINGS MOST 

CONSISTENT WITH CELLULITIS AND POSSIBLE FASCIITIS IN THE RIGHT LOWER EXTREMITY. 

Dr. Roberts has reviewed this report.


CT Interpretation Completed By: Radiologist





- EKG


  ** 12:44


Cardiac Rate: NL


EKG Rhythm: Sinus Rhythm - 92 BPM


Ectopy: None


EKG Interpretation: QT interval prolonged at 515





Altered Mental Statu Course/Dx





- Course


Course Of Treatment: BP noted and advised to follow up with PCP. Allergies 

noted. Medications reviewed.  DISCUSSED WITH ORTHOPEDICS; DR ARIAS AND DR KUMAR. HERE AT [facility], WE ARE UNABLE TO CARE FOR THIS TYPE OF FEMUR FRACTURE.  

TRANSFER TO [facility]. ACCEPTED IN TRANSFER TO THE ICU BY DR MATA.  SPLINTED 

RIGHT LEG IN POSITION. DP PULSES PALPATED BEFORE AND AFTER SPLINTING.  

CAPILLARY REFILL IS SLIGHTLY DELAYED IN BOTH FEET.  PER SON, RT KNEE ALWAYS IS 

FLEXED; THE PATIENT HAS NOT BEEN ABLE TO STRAIGHTEN IT FOR YEARS.  TREATED WITH 

NS FOR SEPSIS AND HYPONATREMIA.





- Diagnoses


Discharge Diagnoses: 


 Blood pressure elevated without history of HTN, Sepsis, Cellulitis, Open femur 

fracture, right








- Provider Notifications


Discussed Care Of Patient With: Shree Sanz


Time Discussed With Above Provider: 13:23


Instructed by Provider To: Other - Dr. Sanz, internist, recommends that the 

patient go to [place]. Dr. Arias, orthopedic surgeon, agrees that the femur 

fracture is beyond the capabilities of [facility] and recommends the patient go to 

[place].





- Critical Care Time


Critical Care Time:  min





Discharge





- Discharge Plan


Condition: Guarded


Disposition: TRANS HIGHER LVL OF CARE FAC


Referrals: 


Randi Nicolas MD [Primary Care Provider] - 


Additional Instructions: 


Your blood pressure was elevated during todays visit; please follow up with 

your primary care provider within a week for further evaluation.





Follow up with your primary care physician. 





Return to the emergency department for any new or worsening symptoms. 





The documentation as recorded by the Jaylon gay Jennifer accurately reflects 

the service I personally performed and the decisions made by me, Abhay Roberts MD.

## 2018-02-05 NOTE — RAD
Indication: Altered mental status. Severe swelling to lower abdomen radiating to both

feet. COPD.



Comparison: August 14, 2017 chest radiograph and August 15, 2017 abdomen CT.



Technique: Upright AP 1227 hours



Report: Chronic RIGHT lower lobe volume loss with partial atelectasis. Small dependent

RIGHT pleural effusion not excluded. Upper normal heart size. Unremarkable central

pulmonary vasculature and mediastinal contours.



IMPRESSION: 

1.Chronic RIGHT lower lobe volume loss with partial atelectasis. Small dependent RIGHT

pleural effusion not excluded. 

2. No evidence for pulmonary edema.

## 2018-02-05 NOTE — RAD
INDICATION:  Soft tissue swelling, erythema, deformity.



COMPARISON:  Comparison is made with a prior x-ray study of the right hip from April 25, 2016 and a prior x-ray study of the right knee from June 05, 2014.



TECHNIQUE: Contiguous axial sections were obtained of the right lower extremity.  Images

were reconstructed in the sagittal and coronal planes.



FINDINGS:  There is diffuse soft tissue swelling present throughout the right thigh, calf

and foot with fluid tracking in the subcutaneous tissues. There is also suggestion of deep

soft tissue swelling in the thigh. No focal fluid collection or abscess is seen.



The bones are osteopenic. There are total right hip and knee prostheses. There is a

transverse slightly comminuted fracture of the mid diaphysis of the femur between the 2

stems of the metallic rods of the prostheses. The fracture fragments are overriding. The

distal fragment is displaced one shaft diameter posterior relative to the proximal

fragment. In addition the distal fragment is angulated posterior and medial relative to

the proximal fragment.



There is a catheter within the urinary bladder. No free intraperitoneal fluid is seen.



The visualized portion of the small bowel colon appear nondistended.



IMPRESSION:

1. THERE IS A DISPLACED ANGULATED PERIPROSTHETIC FRACTURE PRESENT IN THE MID DIAPHYSIS OF

THE RIGHT FEMUR LOCATED BETWEEN THE STEMS OF THE TOTAL HIP AND TOTAL KNEE PROSTHESES.

2. FINDINGS MOST CONSISTENT WITH CELLULITIS AND POSSIBLE FASCIITIS IN THE RIGHT LOWER

EXTREMITY.

## 2018-02-05 NOTE — RAD
HISTORY: Altered mental status



COMPARISONS: August 02, 2014



TECHNIQUE: Multiple contiguous axial CT scans were obtained of the head without 

intravenous contrast. 



FINDINGS: 



The study is limited by patient motion artifact.



HEMORRHAGE/INFARCT: There is no hemorrhage or acute infarct.

MASSES/SHIFT: There is no mass or shift.



EXTRA-AXIAL SPACES: There are no extra-axial fluid collections.

SULCI AND VENTRICLES: There is mild diffuse and proportional enlargement of the sulci and

ventricles.



CEREBRUM: There are no focal parenchymal abnormalities.

BRAINSTEM: There are no focal parenchymal abnormalities.

CEREBELLUM: There are no focal parenchymal abnormalities.



VESSELS: The vessels are grossly normal.

PARANASAL SINUSES: The paranasal sinuses are clear.

ORBITS: The orbits are unremarkable.

BONES AND SOFT TISSUE: No bone or soft tissue abnormalities are noted.



OTHER: None



IMPRESSION: 

NO ACUTE INTRACRANIAL PATHOLOGY.

## 2018-02-05 NOTE — RAD
CLINICAL HISTORY: Soft tissue swelling, erythema, altered mental status



COMPARISON: July 20, 2014



TECHNIQUE: Multiple contiguous axial CT scans were obtained of the abdomen and pelvis,

without intravenous contrast enhancement. Coronal and sagittal multiplanar reformations

are submitted for review.  Oral contrast was not administered.     



FINDINGS: 

The study is limited by the lack of intravenous contrast. This limits evaluation of the

solid organs and vasculature. Evaluation is also limited by patient motion artifact.





LUNG BASES: There is patchy linear opacification of the right middle lobe



LIVER: The liver is normal in shape, size, contour, and attenuation.

BILE DUCTS: There is no intrahepatic or extrahepatic biliary dilatation.

GALLBLADDER: The gallbladder is normal, without pericholecystic inflammatory change.



PANCREAS: The pancreas is normal, without mass or ductal dilatation.

SPLEEN: Normal in size and appearance.



UPPER GI TRACT: Evaluation of the gastrointestinal tract is limited by incomplete gastric

distention. The upper GI tract is unremarkable.

SMALL BOWEL AND MESENTERY: The small bowel is normal in contour, course, and caliber.

There is no obstruction or dilatation.

COLON: The colon is normal in contour, course, caliber. There is no pericolonic

inflammatory change.



ADRENALS: Normal bilaterally.

KIDNEYS: The kidneys are normal in shape, size, contour, and axis. There is no

hydronephrosis or nephrolithiasis.

BLADDER: The bladder is not well evaluated secondary to streak artifact from a right hip

prosthesis, and from the presence of a Schmidt catheter.



PELVIC ORGANS: The pelvic organs are not well evaluated secondary to streak artifact from

a right hip prosthesis.



AORTA: There is extensive calcific atherosclerotic disease of the abdominal aorta and its

branches, without aneurysmal dilatation

IVC: Unremarkable



LYMPH NODES: There are enlarged right inguinal lymph nodes measuring up to 1.7 cm in short

axis.



ABDOMINAL WALL: There is no evidence for abdominal wall hernia.

BONES AND SOFT TISSUES: The patient is status post right hip arthroplasty. There is

diffuse osteopenia. There are multiple chronic or age indeterminate compression

deformities of the lumbar spine, with progression at L3 compared to July 20, 2014. There

is no significant osseous retropulsion. There is subcutaneous edema along the right thigh

and proximal lower extremity. There is atrophy of the gluteal musculature on the right.

OTHER: None



IMPRESSION:

1.  RIGHT INGUINAL LYMPHADENOPATHY.

2.  THERE IS SUBCUTANEOUS EDEMA ALONG THE PROXIMAL RIGHT LOWER EXTREMITY.

3.  ATHEROSCLEROSIS.

4.  MULTIPLE CHRONIC AND AGE-INDETERMINATE COMPRESSION FRACTURES, WITH PROGRESSION AT L2

TO COMPARED TO JULY 20, 2014. THERE IS NO SIGNIFICANT OSSEOUS RETROPULSION.

## 2019-01-01 ENCOUNTER — HOSPITAL ENCOUNTER (INPATIENT)
Dept: HOSPITAL 25 - ED | Age: 69
LOS: 5 days | Discharge: HOME | DRG: 644 | End: 2019-01-06
Attending: INTERNAL MEDICINE | Admitting: HOSPITALIST
Payer: MEDICARE

## 2019-01-01 DIAGNOSIS — Z89.611: ICD-10-CM

## 2019-01-01 DIAGNOSIS — B95.62: ICD-10-CM

## 2019-01-01 DIAGNOSIS — Z79.82: ICD-10-CM

## 2019-01-01 DIAGNOSIS — E83.42: ICD-10-CM

## 2019-01-01 DIAGNOSIS — Z79.891: ICD-10-CM

## 2019-01-01 DIAGNOSIS — Z79.899: ICD-10-CM

## 2019-01-01 DIAGNOSIS — L03.115: ICD-10-CM

## 2019-01-01 DIAGNOSIS — M81.0: ICD-10-CM

## 2019-01-01 DIAGNOSIS — Z88.8: ICD-10-CM

## 2019-01-01 DIAGNOSIS — F17.210: ICD-10-CM

## 2019-01-01 DIAGNOSIS — G89.29: ICD-10-CM

## 2019-01-01 DIAGNOSIS — I73.9: ICD-10-CM

## 2019-01-01 DIAGNOSIS — F41.8: ICD-10-CM

## 2019-01-01 DIAGNOSIS — E22.2: Primary | ICD-10-CM

## 2019-01-01 DIAGNOSIS — R32: ICD-10-CM

## 2019-01-01 DIAGNOSIS — F10.20: ICD-10-CM

## 2019-01-01 DIAGNOSIS — D68.2: ICD-10-CM
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
## 2019-01-01 DIAGNOSIS — G54.6: ICD-10-CM

## 2019-01-01 DIAGNOSIS — Z82.49: ICD-10-CM

## 2019-01-01 DIAGNOSIS — J44.9: ICD-10-CM

## 2019-01-01 DIAGNOSIS — I25.10: ICD-10-CM

## 2019-01-01 DIAGNOSIS — L89.892: ICD-10-CM

## 2019-01-01 DIAGNOSIS — I25.2: ICD-10-CM

## 2019-01-01 DIAGNOSIS — M54.9: ICD-10-CM

## 2019-01-01 LAB
ALBUMIN SERPL BCG-MCNC: 3.9 G/DL (ref 3.2–5.2)
ALBUMIN/GLOB SERPL: 1.1 {RATIO} (ref 1–3)
ALP SERPL-CCNC: 86 U/L (ref 34–104)
ALT SERPL W P-5'-P-CCNC: 4 U/L (ref 7–52)
ANION GAP SERPL CALC-SCNC: 7 MMOL/L (ref 2–11)
AST SERPL-CCNC: 12 U/L (ref 13–39)
BASOPHILS # BLD AUTO: 0.1 10^3/UL (ref 0–0.2)
BUN SERPL-MCNC: 3 MG/DL (ref 6–24)
BUN/CREAT SERPL: 5.7 (ref 8–20)
CALCIUM SERPL-MCNC: 8.5 MG/DL (ref 8.6–10.3)
CHLORIDE SERPL-SCNC: 86 MMOL/L (ref 101–111)
EOSINOPHIL # BLD AUTO: 0.2 10^3/UL (ref 0–0.6)
GLOBULIN SER CALC-MCNC: 3.4 G/DL (ref 2–4)
GLUCOSE SERPL-MCNC: 95 MG/DL (ref 70–100)
HCO3 SERPL-SCNC: 25 MMOL/L (ref 22–32)
HCT VFR BLD AUTO: 35 % (ref 42–52)
HGB BLD-MCNC: 11.1 G/DL (ref 14–18)
LYMPHOCYTES # BLD AUTO: 2.1 10^3/UL (ref 1–4.8)
MCH RBC QN AUTO: 22 PG (ref 27–31)
MCHC RBC AUTO-ENTMCNC: 32 G/DL (ref 31–36)
MCV RBC AUTO: 70 FL (ref 80–94)
MONOCYTES # BLD AUTO: 1.3 10^3/UL (ref 0–0.8)
NEUTROPHILS # BLD AUTO: 7.2 10^3/UL (ref 1.5–7.7)
NRBC # BLD AUTO: 0 10^3/UL
NRBC BLD QL AUTO: 0
PLATELET # BLD AUTO: 268 10^3/UL (ref 150–450)
POTASSIUM SERPL-SCNC: 3.9 MMOL/L (ref 3.5–5)
PROT SERPL-MCNC: 7.3 G/DL (ref 6.4–8.9)
RBC # BLD AUTO: 5.03 10^6/UL (ref 4–5.4)
SODIUM SERPL-SCNC: 118 MMOL/L (ref 135–145)
TSH SERPL-ACNC: 0.82 MCIU/ML (ref 0.34–5.6)
WBC # BLD AUTO: 10.8 10^3/UL (ref 3.5–10.8)

## 2019-01-01 PROCEDURE — 85025 COMPLETE CBC W/AUTO DIFF WBC: CPT

## 2019-01-01 PROCEDURE — 80202 ASSAY OF VANCOMYCIN: CPT

## 2019-01-01 PROCEDURE — 36415 COLL VENOUS BLD VENIPUNCTURE: CPT

## 2019-01-01 PROCEDURE — 71046 X-RAY EXAM CHEST 2 VIEWS: CPT

## 2019-01-01 PROCEDURE — G0480 DRUG TEST DEF 1-7 CLASSES: HCPCS

## 2019-01-01 PROCEDURE — 83735 ASSAY OF MAGNESIUM: CPT

## 2019-01-01 PROCEDURE — 81003 URINALYSIS AUTO W/O SCOPE: CPT

## 2019-01-01 PROCEDURE — 80320 DRUG SCREEN QUANTALCOHOLS: CPT

## 2019-01-01 PROCEDURE — 84100 ASSAY OF PHOSPHORUS: CPT

## 2019-01-01 PROCEDURE — 85027 COMPLETE CBC AUTOMATED: CPT

## 2019-01-01 PROCEDURE — 80051 ELECTROLYTE PANEL: CPT

## 2019-01-01 PROCEDURE — 99284 EMERGENCY DEPT VISIT MOD MDM: CPT

## 2019-01-01 PROCEDURE — 80307 DRUG TEST PRSMV CHEM ANLYZR: CPT

## 2019-01-01 PROCEDURE — 80048 BASIC METABOLIC PNL TOTAL CA: CPT

## 2019-01-01 PROCEDURE — 80053 COMPREHEN METABOLIC PANEL: CPT

## 2019-01-01 PROCEDURE — 87641 MR-STAPH DNA AMP PROBE: CPT

## 2019-01-01 PROCEDURE — 84443 ASSAY THYROID STIM HORMONE: CPT

## 2019-01-01 RX ADMIN — ENOXAPARIN SODIUM SCH MG: 40 INJECTION SUBCUTANEOUS at 20:43

## 2019-01-01 RX ADMIN — OXYCODONE HYDROCHLORIDE AND ACETAMINOPHEN PRN TAB: 5; 325 TABLET ORAL at 20:45

## 2019-01-01 RX ADMIN — CEPHALEXIN SCH MG: 500 CAPSULE ORAL at 20:43

## 2019-01-01 RX ADMIN — OXYCODONE HYDROCHLORIDE PRN MG: 5 CAPSULE ORAL at 18:59

## 2019-01-01 RX ADMIN — SODIUM CHLORIDE SCH MLS/HR: 900 IRRIGANT IRRIGATION at 22:15

## 2019-01-01 NOTE — HP
CC:  Dr. Randi Nicolas *

 

HISTORY AND PHYSICAL:

 

DATE OF ADMISSION:  19.

 

PROVIDER:  Alondra Horta NP.

 

PRIMARY CARE PROVIDER:  Dr. Randi Nicolas.

 

ATTENDING PHYSICIAN WHILE IN THE HOSPITAL:  Dr. Maria Isabel Lino * (dictated by 
Alondra Horta NP).

 

CHIEF COMPLAINT:  Posterior thigh pain.

 

HISTORY OF PRESENT ILLNESS:  The patient reports that his son called the 
ambulance due to the inability to control his pain to right posterior thigh and 
an area of an open sore.  According to the emergency room record, it was 
reported to them that the son had called EMS because the father had been binge 
drinking as he was unable to care for himself and was discharged from [facility] 
in November and has increased incontinence for the last few weeks.  The patient 
himself reports that he has an open sore on the right posterior thigh that has 
been there for approximately 1 week.  He does report a fever, but did not take 
his temperature.  The patient reports that he drinks 4 cans of beer daily and 
did not drink 4 cans of beer today. He also reports urinary urgency x1 week, 
but no frequency, burning or hematuria. He denies any cough, hemoptysis or 
shortness of breath.  He denies any nausea, vomiting or diarrhea or abdominal 
pain.  He denies any focal weakness or sensory loss.  Denies any dysphagia.  He 
does report an open sore to the right posterior thigh that is very painful.  He 
denies any psychosis or anxiety.

 

PAST MEDICAL HISTORY:  Significant for:

1.  History of alcohol abuse.

2.  MI.

3.  Back pain.

4.  Osteoporosis.

5.  COPD.

6.  Thrombocytopenia history.

7.  Peripheral vascular disease.

8.  Coronary artery disease.

9.  Chronic hyponatremia.

10.  History of GI bleed.

11.  History of factor V Leiden deficiency.

 

PAST SURGICAL HISTORY:

1.  Right hip ORIF.

2.  Hernia repair.

3.  Ear surgery.

4.  Right above-the-knee amputation.

 

HOME MEDICATIONS:

1.  Nystatin powder topically b.i.d.

2.  Trazodone 100 mg p.o. at bedtime as needed.

3.  Senna 2 tabs p.o. b.i.d.

4.  Protonix 40 mg p.o. daily.

5.  Oxycodone 5 mg p.o. t.i.d. p.r.n.

6.  Zofran 4 mg p.o. q.6 hours as needed.

7.  Lisinopril 2.5 mg p.o. daily.

8.  Hydrocodone/acetaminophen 5/325 mg 1 tablet p.o. every 8 hours as needed.

9.  Gabapentin 300 mg p.o. t.i.d.

10.  Duloxetine 30 mg p.o. daily.

11.  Keflex 500 mg p.o. 4 times a day.

12.  Aspirin 81 mg p.o. daily.

 

ALLERGIES TO MEDICATIONS:  FENTANYL.

 

FAMILY HISTORY:  Brother with heart disease,  at the age of 50.  No 
reported history of diabetes.  Cancer:  Brother, father, and mother all with 
lung cancer.

 

SOCIAL HISTORY:  The patient reports he smokes a half a pack per day.  He does 
report he drinks approximately 4 beers daily.  Denies any illicit drug use.  He 
is single.  He lives with his son.  Surrogate decision in the event he is 
unable to make his own decisions is his son.  He is a full code.

 

REVIEW OF SYSTEMS:  The patient reports fevers at home.  He denies any chest 
pain, cough, hemoptysis or shortness of breath.  Denies any nausea, vomiting or 
diarrhea. He denies any gross hematuria or dysuria.  He does report urgency x1 
week.  He denies any weakness or sensory loss, visual complaints, dysphagia, 
arthralgias or myalgias.  He does complain of posterior right thigh pain with 
open sore.  He denies any anxiety or depression.

 

                               PHYSICAL EXAMINATION

 

GENERAL:  At this time, Mr. Bowden is a 68-year-old male.  He appears 
uncomfortable sitting on the stretcher in the emergency room.

 

VITAL SIGNS:  Blood pressure 114/59, heart rate 72, respirations are 16, O2 
saturation 95%, temperature was 97.3 on arrival.

 

HEENT:  Head is atraumatic and normocephalic.  Eyes:  EOMs are intact.  Sclerae 
are anicteric and not pale.  Oral mucosa appears to be dry.

 

NECK:  Supple.

 

LUNGS:  With expiratory wheezes bilaterally, diminished throughout.

 

CARDIAC:  S1, S2.  Regular rate and rhythm.  No murmurs, rubs or gallops.

 

ABDOMEN:  Soft and nontender.  Bowel sounds are present x4.

 

EXTREMITIES:  Left pedal pulse is +1.  He is able to move his upper extremities 
with 5/5 strength, his left lower leg with 4/5 strength.  His right lower leg 
is amputated.  Stump is warm to touch.

 

NEUROLOGIC:  He is awake, alert, oriented x3.  Speech is clear.  He is hard of 
hearing.  There are no gross focal deficits.

 

SKIN:  He has a round open area noted to the right posterior thigh with 
surrounding erythema.

 

 DIAGNOSTIC STUDIES/LAB DATA:  WBCs are 10.8, RBCs 5.03, hemoglobin 11.1, 
hematocrit was 35, platelet count was 268,000.  Sodium was 118, potassium 3.9, 
chloride was 86, carbon dioxide was 25, anion gap was 7, BUN was 13, creatinine 
0.53, glucose was 95, calcium 8.5.  ASTs were 12, ALTs were 4, TSH was 0.82.  
Urine color was yellow, appearance clear, pH was 6, specific gravity 1.005.  
Urine protein, ketones, blood, nitrites, bilirubin, urobilinogen, and leukocyte 
esterase, and glucose were all negative.  Urine opiates, barbiturates, 
amphetamines, benzodiazepines, cocaine, and cannabis were all not detected.  
Serum alcohol was 226.

 

EKG pending.  Chest x-ray is pending.

 

ASSESSMENT AND PLAN:  Mr. Bowden is a 68-year-old male, who presented to 
the emergency room today with the complaints of increased pain to right 
posterior thigh.  Per EMS report, the patient has had increased incontinence 
times a week and unable to care for himself at home as well as binge drinking.  
He will be admitted inpatient for:

 

1.  Hyponatremia.  I suspect this is related to alcohol abuse.  He did receive 
1 L normal saline bolus in the emergency room.  I will start normal saline at 
125 an hour.  I will repeat electrolyte panel at 10 p.m. tonight and monitor 
his sodium. We will also repeat a BMP and CBC in the a.m.  I will get a serum 
osmolality, a urine sodium and osmolality as well.

2.  Right posterior thigh open wound/cellulitis.  The patient was started on 
Keflex yesterday.  I will continue Keflex 500 mg p.o. 4 times a day and monitor 
the wound for any changes.  I will get a wound culture as well.

3.  Coronary artery disease.  Continue him on aspirin 81 mg p.o. daily.

4.  Hypertension.  He will continue on lisinopril 2.5 mg p.o. daily.

5.  Chronic pain.  He will continue on gabapentin 300 mg p.o. t.i.d.

6.  Anxiety/depression.  I am going to hold his Cymbalta at this time as it can 
be contributing to his hyponatremia.

7.  FEN.  He can be placed on a heart healthy, decaf-okay diet.

8.  DVT prophylaxis.  I will place him on Lovenox 40 mg subcu q.24 hours.

9.  Code status.  He is a full code.

 

TIME SPENT:  Time spent on this admission was 60 minutes, greater than half of 
that time was spent face-to-face with the patient obtaining my history and 
physical and the other half of the time was spent going over my plan of care 
and implementing my plan of care.

 

I have discussed this with my attending, Dr. Maria Isabel Lino.  She is in 
agreement with my plan.

 

 ____________________________________ ALONDRA HORTA NP

 

[id number]/CPS #: [id number]

RUBIO

## 2019-01-01 NOTE — ED
GI/ HPI





- HPI Summary


HPI Summary: 





This patient is a 68 year old M brought in by ambulance with a chief complaint 

of incontinence since the last few weeks. The patients son called EMS because 

the patient has been binge drinking and is unable to care for himself. The 

patient was discharged from [facility] [place] in November for rehab. Patient reports 

erythema under abdominal fold. PMHX hearing problems, right leg amputation 2 

years ago. SHX lives at home with son, EtOH abuse.





- History of Current Complaint


Chief Complaint: EDSubstanceAbuse


Time Seen by Provider: 01/01/19 15:26


Stated Complaint: 2209


Hx Obtained From: Patient, EMS


Onset/Duration: Started Weeks Ago


Pain Intensity: 0





- Additional Pertinent History


Primary Care Physician: BLAYNE





- Allergy/Home Medications


Allergies/Adverse Reactions: 


 Allergies











Allergy/AdvReac Type Severity Reaction Status Date / Time


 


fentanyl Allergy  Itching Verified 01/01/19 15:59











Home Medications: 


 Home Medications





Aspirin [Aspirin EC] 81 mg PO DAILY 01/01/19 [History Confirmed 01/01/19]


Cephalexin CAP* [Keflex 500 CAP*] 500 mg PO QID 01/01/19 [History Confirmed 01/ 01/19]


DULoxetine DR CAP* [Cymbalta CAP*] 30 mg PO DAILY 01/01/19 [History Confirmed 01 /01/19]


Gabapentin CAP(*) [Neurontin 100 mg CAP(*)] 300 mg PO TID 01/01/19 [History 

Confirmed 01/01/19]


Hydrocodone/Acetaminophen [Hydrocodone-Acetamin 5-325 mg] 1 tab PO TID PRN 01/01 /19 [History Confirmed 01/01/19]


Lisinopril [Lisinopril 2.5 MG-] 2.5 mg PO DAILY 01/01/19 [History Confirmed 01/ 01/19]


Nystatin TOP POWDER* 1 applic TOPICAL BID PRN 01/01/19 [History Confirmed 01/01/ 19]


Ondansetron TAB* [Zofran 4 MG Tab*] 4 mg PO Q6H PRN 01/01/19 [History Confirmed 

01/01/19]


Oxycodone HCl 5 mg PO TID PRN 01/01/19 [History Confirmed 01/01/19]


Pantoprazole TAB (NF) [Protonix TAB (NF)] 40 mg PO DAILY 01/01/19 [History 

Confirmed 01/01/19]


Sennosides/Docusate Sodium [Ra P-Col Rite Tablet] 2 tab PO BID 01/01/19 [

History Confirmed 01/01/19]











PMH/Surg Hx/FS Hx/Imm Hx


Endocrine/Hematology History: Reports: Hx Blood Disorders - factor 5 clotting 

disorder


   Denies: Hx Anticoagulant Therapy, Hx Diabetes, Hx Thyroid Disease


Cardiovascular History: Reports: Hx Angina, Hx Coronary Artery Disease, Hx Deep 

Vein Thrombosis, Hx Myocardial Infarction, Hx Peripheral Vascular Disease, 

Other Cardiovascular Problems/Disorders - fACTOR V LEIDEN CLOTTING DISORDER


   Denies: Hx Cardiomegaly, Hx Congestive Heart Failure, Hx Hypertension, Hx 

Pacemaker/ICD, Hx Rheumatic Fever, Hx Valvular Heart Disease


Respiratory History: Reports: Hx Asthma, Hx Chronic Obstructive Pulmonary 

Disease (COPD), Other Respiratory Problems/Disorders - Longterm smoker


   Denies: Hx Pulmonary Edema, Hx Pulmonary Embolism


GI History: Reports: Hx Gastroesophageal Reflux Disease, Other GI Disorders - 

HX OF GI BLEED - NO PROBLEMS NOW


   Denies: Hx Cirrhosis, Hx Crohn's Disease, Hx Hiatal Hernia, Hx Irritable 

Bowel, Hx Jaundice, Hx Ulcer


 History: 


   Denies: Hx Renal Disease, Other  Problems/Disorders


Musculoskeletal History: Reports: Hx Arthritis, Hx Back Problems, Hx 

Osteoporosis, Other Musculoskeletal History - Fx hip sp fall at home hx


   Denies: Hx Rheumatoid Arthritis, Hx Bursitis


Sensory History: Reports: Hx Cataracts - HAVING CATARACT SURGERY, Hx Contacts 

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


   Denies: Hx Hearing Aid


Opthamlomology History: Reports: Hx Cataracts - HAVING CATARACT SURGERY, Hx 

Contacts or Glasses - for reading


Neurological History: 


   Denies: Hx Headaches, Hx Migraine, Hx Seizures


   Comment Only: Other Neuro Impairments/Disorders - NEUROPATHY/HX SUBSTANCE 

ABUSE


Psychiatric History: Reports: Hx Depression, Hx Substance Abuse


   Denies: Hx Anxiety, Hx Panic Disorder





- Cancer History


Hx Chemotherapy: No





- Surgical History


Surgery Procedure, Year, and Place: [place] [facility] [place] RTHR 

April 2014; Right total knee replacement june 5 2014, [facility] [place].  right ear surgery, 

HERNIA REPAIR


Hx Anesthesia Reactions: No





- Immunization History


Date of Tetanus Vaccine: 2011


Date of Influenza Vaccine: None


Infectious Disease History: No


Infectious Disease History: Reports: Hx of Known/Suspected MRSA


   Denies: Hx Hepatitis, Traveled Outside the [place] in Last 30 Days





- Family History


Known Family History: Positive: Cardiac Disease, Other - CANCER





- Social History


Alcohol Use: Daily


Alcohol Amount: NOT SINCE NEW YEARS


Substance Use Type: Reports: None


Hx Tobacco Use: Yes


Smoking Status (MU): Heavy Every Day Tobacco Smoker


Type: Cigarettes


Amount Used/How Often: 1 - 2 PPD


Length of Time of Smoking/Using Tobacco: 50 YEARS


Have You Smoked in the Last Year: Yes





Review of Systems


Positive: Rash - under abd fold


Positive: Other - EtOH abuse


All Other Systems Reviewed And Are Negative: Yes





Physical Exam





- Summary


Physical Exam Summary: 





Appearance: Unkempt looking, elderly, lying in bed comfortably. When changed, 

had a large amount of stool in his pants that he was unaware of.


Skin: Warm, dry, no obvious rash


Eyes: sclera anicteric, no conjunctival pallor


ENT: mucous membranes moist, pharynx appears normal


Neck: Supple, nontender


Respiratory: Clear to auscultation, no signs of respiratory distress


Cardiovascular: Normal S1, S2. No murmurs. Normal distal pulses in tibial and 

radial bilaterally.


Abdomen: Soft, nontender, normal active bowel sounds present


Musculoskeletal: Normal, Strength/ROM Intact. Right AKA, knee amputation


Neurological: A&Ox3, awake and alert, mentation is normal, speech is fluent and 

appropriate


Psychiatric: affect is normal, does not appear anxious or depressed





Triage Information Reviewed: Yes


Vital Signs On Initial Exam: 


 Initial Vitals











Temp Pulse Resp BP Pulse Ox


 


 97.3 F   76   16   120/67   95 


 


 01/01/19 15:26  01/01/19 15:26  01/01/19 15:26  01/01/19 15:26  01/01/19 15:26











Vital Signs Reviewed: Yes





Diagnostics





- Vital Signs


 Vital Signs











  Temp Pulse Resp BP Pulse Ox


 


 01/01/19 15:26  97.3 F  76  16  120/67  95














- Laboratory


Result Diagrams: 


 01/02/19 06:23





 01/02/19 06:23


Lab Statement: Any lab studies that have been ordered have been reviewed, and 

results considered in the medical decision making process.





GIGU Course/Dx





- Course


Course Of Treatment: This patient is a 68 year old M brought in by ambulance 

with a chief complaint of incontinence since the last few weeks. The patients 

son called EMS because the patient has been binge drinking and is unable to 

care for himself. The patient was discharged from [facility] [place] in November for 

rehab. Patient reports erythema under abdominal fold.  Test results with no 

significant abnormalities except for sodium 118 L.  In the ED course the 

patient was given Lorazepam, IV fluids, and Nicotine.  We discussed patient 

care with Dr. Levine and they recommended admission.





- Diagnoses


Provider Diagnoses: 


 Hyponatremia, Altered mental status, Alcoholism








- Physician Notifications


Discussed Care Of Patient With: Shira Levine


Time Discussed With Above Provider: 17:15


Instructed by Provider To: Admit As Inpatient





Discharge





- Sign-Out/Discharge


Documenting (check all that apply): Patient Departure - admission





- Discharge Plan


Condition: Fair


Disposition: ADMITTED TO [facility]





- Billing Disposition and Condition


Condition: FAIR


Disposition: Admitted to [facility]





- Attestation Statements


Document Initiated by Amparo: Yes


Documenting Scribe: Rene Mcdaniels


Provider For Whom Amparo is Documenting (Include Credential): Willy Juan MD


Scribe Attestation: 


Rene KATE scribed for Willy Juan MD on 01/02/19 at 1823. 


Scribe Documentation Reviewed: Yes


Provider Attestation: 


The documentation as recorded by the Rene gay accurately reflects 

the service I personally performed and the decisions made by Willy ling MD


Status of Scribe Document: Viewed

## 2019-01-02 LAB
ANION GAP SERPL CALC-SCNC: 15 MMOL/L (ref 2–11)
ANION GAP SERPL CALC-SCNC: 8 MMOL/L (ref 2–11)
BASOPHILS # BLD AUTO: 0.1 10^3/UL (ref 0–0.2)
BUN SERPL-MCNC: 4 MG/DL (ref 6–24)
BUN/CREAT SERPL: 7.8 (ref 8–20)
CALCIUM SERPL-MCNC: 8.6 MG/DL (ref 8.6–10.3)
CHLORIDE SERPL-SCNC: 93 MMOL/L (ref 101–111)
CHLORIDE SERPL-SCNC: 98 MMOL/L (ref 101–111)
EOSINOPHIL # BLD AUTO: 0.2 10^3/UL (ref 0–0.6)
GLUCOSE SERPL-MCNC: 86 MG/DL (ref 70–100)
HCO3 SERPL-SCNC: 17 MMOL/L (ref 22–32)
HCO3 SERPL-SCNC: 26 MMOL/L (ref 22–32)
HCT VFR BLD AUTO: 33 % (ref 42–52)
HGB BLD-MCNC: 10.4 G/DL (ref 14–18)
LYMPHOCYTES # BLD AUTO: 1.3 10^3/UL (ref 1–4.8)
MCH RBC QN AUTO: 22 PG (ref 27–31)
MCHC RBC AUTO-ENTMCNC: 32 G/DL (ref 31–36)
MCV RBC AUTO: 70 FL (ref 80–94)
MONOCYTES # BLD AUTO: 1.4 10^3/UL (ref 0–0.8)
NEUTROPHILS # BLD AUTO: 5.2 10^3/UL (ref 1.5–7.7)
NRBC # BLD AUTO: 0 10^3/UL
NRBC BLD QL AUTO: 0
PLATELET # BLD AUTO: 217 10^3/UL (ref 150–450)
POTASSIUM SERPL-SCNC: 4.1 MMOL/L (ref 3.5–5)
POTASSIUM SERPL-SCNC: 4.2 MMOL/L (ref 3.5–5)
RBC # BLD AUTO: 4.71 10^6/UL (ref 4–5.4)
SODIUM SERPL-SCNC: 125 MMOL/L (ref 135–145)
SODIUM SERPL-SCNC: 132 MMOL/L (ref 135–145)
WBC # BLD AUTO: 8.2 10^3/UL (ref 3.5–10.8)

## 2019-01-02 RX ADMIN — OXYCODONE HYDROCHLORIDE PRN MG: 5 CAPSULE ORAL at 01:08

## 2019-01-02 RX ADMIN — GABAPENTIN SCH MG: 300 CAPSULE ORAL at 21:41

## 2019-01-02 RX ADMIN — GABAPENTIN SCH MG: 300 CAPSULE ORAL at 13:54

## 2019-01-02 RX ADMIN — ASPIRIN SCH MG: 81 TABLET, COATED ORAL at 08:47

## 2019-01-02 RX ADMIN — CEPHALEXIN SCH MG: 500 CAPSULE ORAL at 08:47

## 2019-01-02 RX ADMIN — LISINOPRIL SCH MG: 5 TABLET ORAL at 08:47

## 2019-01-02 RX ADMIN — FOLIC ACID SCH MG: 1 TABLET ORAL at 08:48

## 2019-01-02 RX ADMIN — THERA TABS SCH TAB: TAB at 08:48

## 2019-01-02 RX ADMIN — ENOXAPARIN SODIUM SCH MG: 40 INJECTION SUBCUTANEOUS at 21:41

## 2019-01-02 RX ADMIN — CEPHALEXIN SCH MG: 500 CAPSULE ORAL at 13:55

## 2019-01-02 RX ADMIN — SODIUM CHLORIDE SCH MLS/HR: 900 IRRIGANT IRRIGATION at 08:40

## 2019-01-02 RX ADMIN — GABAPENTIN SCH MG: 300 CAPSULE ORAL at 17:53

## 2019-01-02 RX ADMIN — GABAPENTIN SCH MG: 300 CAPSULE ORAL at 08:48

## 2019-01-02 RX ADMIN — OMEPRAZOLE SCH MG: 20 CAPSULE, DELAYED RELEASE ORAL at 08:48

## 2019-01-02 RX ADMIN — Medication SCH MG: at 08:48

## 2019-01-02 RX ADMIN — CEPHALEXIN SCH MG: 500 CAPSULE ORAL at 17:54

## 2019-01-02 RX ADMIN — GABAPENTIN SCH MG: 300 CAPSULE ORAL at 05:11

## 2019-01-02 NOTE — PN
Subjective


Date of Service: 01/02/19


Interval History: 


HOSPITALIST PROGRESS NOTE


Patient seen and examined at bedside. Care reviewed and d/w Alondra Richardson RN.


He c/o right stump pain and is concerned it is more swollen than the left. "

This is not normal!"


Family History: Unchanged from Admission


Social History: Unchanged from Admission


Past Medical History: Unchanged from Admission





Objective


Active Medications: 








Acetaminophen (Tylenol Tab*)  650 mg PO Q4H PRN


   PRN Reason: FEVER/PAIN


Aspirin (Aspirin Ec Tab*)  81 mg PO DAILY [facility]


   Last Admin: 01/02/19 08:47 Dose:  81 mg


Cephalexin HCl (Keflex Cap*)  500 mg PO QID [facility]


   Last Admin: 01/02/19 13:55 Dose:  500 mg


Device (Nicotine Mouth Piece*)  1 each INH ONCE PRN


   PRN Reason: CRAVINGS


Docusate Sodium (Colace Cap*)  100 mg PO BID PRN


   PRN Reason: CONSTIPATION


Enoxaparin Sodium (Lovenox(*))  40 mg SUBCUT Q24H [facility]


   Last Admin: 01/01/19 20:43 Dose:  40 mg


Folic Acid (Folvite Tab*)  1 mg PO DAILY [facility]


   Last Admin: 01/02/19 08:48 Dose:  1 mg


Gabapentin (Neurontin Cap(*))  300 mg PO QID [facility]


   Last Admin: 01/02/19 13:54 Dose:  300 mg


Sodium Chloride (Ns 0.9% 1000 Ml*)  1,000 mls @ 125 mls/hr IV PER RATE [facility]


   Last Admin: 01/02/19 08:40 Dose:  125 mls/hr


Vancomycin HCl 1,000 mg/ (Sodium Chloride)  250 mls @ 166.667 mls/hr IVPB Q8H 

[facility]; Protocol


Lisinopril (Prinivil Tab*)  2.5 mg PO DAILY [facility]


   Last Admin: 01/02/19 08:47 Dose:  2.5 mg


Lorazepam (Ativan Tab(*))  0 - 6 mg PO .PER [facility] PROTOCOL [facility]; Protocol


Multivitamins/Minerals (Theragran/Minerals Tab*)  1 tab PO DAILY [facility]


   Last Admin: 01/02/19 08:48 Dose:  1 tab


Nicotine (Nicotine Inhaler*)  10 mg INH Q2H PRN


   PRN Reason: CRAVING


Nystatin (Nystatin Top Powder*)  1 applic TOPICAL BID PRN


   PRN Reason: RASH


   Last Admin: 01/02/19 00:24 Dose:  1 applic


Omeprazole (Prilosec Cap*)  20 mg PO DAILY@0730 MARTIN


   Last Admin: 01/02/19 08:48 Dose:  20 mg


Oxycodone HCl (Roxycodone Tab*)  5 mg PO TID PRN


   PRN Reason: PAIN


   Last Admin: 01/02/19 01:08 Dose:  5 mg


Oxycodone/Acetaminophen (Percocet 5/325 Tab*)  1 tab PO Q4H PRN


   PRN Reason: Pain


   Last Admin: 01/01/19 20:45 Dose:  1 tab


Pharmacy Profile Note (Vancomycin Trough Check)  1 note FOLLOW UP ONCE ONE


   Stop: 01/03/19 12:31


Thiamine HCl (Vitamin B-1 Tab*)  100 mg PO DAILY [facility]


   Last Admin: 01/02/19 08:48 Dose:  100 mg


Trazodone HCl (Desyrel Tab*)  100 mg PO BEDTIME PRN


   PRN Reason: SLEEP








 Vital Signs - 8 hr











  01/02/19 01/02/19 01/02/19





  11:00 11:45 12:00


 


Temperature  98.0 F 


 


Pulse Rate  87 


 


Respiratory 18 18 18





Rate   


 


Blood Pressure  119/63 





(mmHg)   


 


O2 Sat by Pulse  95 





Oximetry   














  01/02/19





  13:54


 


Temperature 


 


Pulse Rate 


 


Respiratory 18





Rate 


 


Blood Pressure 





(mmHg) 


 


O2 Sat by Pulse 





Oximetry 











Oxygen Devices in Use Now: None


Appearance: Elderly gentlema with disheveled appearance sitting up in bed in NAD


Eyes: No Scleral Icterus


Ears/Nose/Mouth/Throat: Mucous Membranes Moist


Neck: Trachea Midline


Respiratory: Symmetrical Chest Expansion and Respiratory Effort, Clear to 

Auscultation


Cardiovascular: RRR - Normal S1 and S2


Extremities: - - Right stump is swollen, with decubitus ulcer on posterior 

aspect, + erythema, no drainage


Neurological: Alert and Oriented x 3, - - [place]


Result Diagrams: 


 01/02/19 06:23





 01/02/19 06:23


Microbiology and Other Data: 


 Microbiology











 01/02/19 03:15 Nasal Screen MRSA (PCR) - Final





 Nasal    Mrsa Detected














Assess/Plan/Problems-Billing


Assessment: 


Mr Bowden is a 69yo M with PMH of ETOH abuse, CAD, GI bleed, back pain, 

COPD, PVD, GI bleed, Factor V Leiden, right AKA, who presented to ED with c/o 

right thigh pain, found to have right thigh pressure ulcer with surrounding 

cellulitis and severe hyponatremia.





- Patient Problems


(1) Hyponatremia


Comment: - Severe hyponatremia - likely beer potomania on top of SIADH.


- Improving.   





(2) Pressure ulcer


Comment: - Posterior right thigh, present on admission.


- Wound care appreciated - cover with Duoderm.   





(3) Cellulitis


Comment: - Right posterior thigh - wound culture grew MRSA - continue 

Vancomycin.


- US was negative for DVT.


- ID consult requested.   





(4) Alcoholism


Comment: - No evidence of withdrawal at this time.


- Continue WAM protocol.   





(5) Chronic pain


Comment: - Neuropathic in nature - continue Gabapentin.   





(6) DVT prophylaxis


Comment: - Lovenox.   





(7) Full code status





Status and Disposition: 


Inpatient.

## 2019-01-02 NOTE — PN
Progress Note





- Progress Note


Date of Service: 01/02/19


Note: 





Paged due to significant phantom limb pain - Nasal swab positive for MRSA.  

Will give one dose of Vancomycin and recommend reassessing antibiotic regimen 

in AM.  Will increase his gabapentin to QID for phantom pain.  Hesitant to 

increase his opiates in setting of his EtOH abuse history.  Not currently 

scoring high on WAM protocol.

## 2019-01-03 LAB
ANION GAP SERPL CALC-SCNC: 6 MMOL/L (ref 2–11)
BUN SERPL-MCNC: 5 MG/DL (ref 6–24)
BUN/CREAT SERPL: 8.6 (ref 8–20)
CALCIUM SERPL-MCNC: 8.6 MG/DL (ref 8.6–10.3)
CHLORIDE SERPL-SCNC: 101 MMOL/L (ref 101–111)
GLUCOSE SERPL-MCNC: 94 MG/DL (ref 70–100)
HCO3 SERPL-SCNC: 26 MMOL/L (ref 22–32)
POTASSIUM SERPL-SCNC: 3.8 MMOL/L (ref 3.5–5)
SODIUM SERPL-SCNC: 133 MMOL/L (ref 135–145)

## 2019-01-03 RX ADMIN — LISINOPRIL SCH MG: 5 TABLET ORAL at 09:27

## 2019-01-03 RX ADMIN — OMEPRAZOLE SCH MG: 20 CAPSULE, DELAYED RELEASE ORAL at 09:26

## 2019-01-03 RX ADMIN — VANCOMYCIN HYDROCHLORIDE SCH MLS/HR: 1 INJECTION, POWDER, LYOPHILIZED, FOR SOLUTION INTRAVENOUS at 20:01

## 2019-01-03 RX ADMIN — GABAPENTIN SCH MG: 300 CAPSULE ORAL at 14:10

## 2019-01-03 RX ADMIN — GABAPENTIN SCH MG: 300 CAPSULE ORAL at 09:28

## 2019-01-03 RX ADMIN — FOLIC ACID SCH MG: 1 TABLET ORAL at 09:26

## 2019-01-03 RX ADMIN — Medication SCH MG: at 09:27

## 2019-01-03 RX ADMIN — VANCOMYCIN HYDROCHLORIDE SCH MLS/HR: 1 INJECTION, POWDER, LYOPHILIZED, FOR SOLUTION INTRAVENOUS at 12:18

## 2019-01-03 RX ADMIN — THERA TABS SCH TAB: TAB at 09:27

## 2019-01-03 RX ADMIN — GABAPENTIN SCH MG: 300 CAPSULE ORAL at 20:02

## 2019-01-03 RX ADMIN — OXYCODONE HYDROCHLORIDE PRN MG: 5 CAPSULE ORAL at 01:24

## 2019-01-03 RX ADMIN — ENOXAPARIN SODIUM SCH MG: 40 INJECTION SUBCUTANEOUS at 20:02

## 2019-01-03 RX ADMIN — ASPIRIN SCH MG: 81 TABLET, COATED ORAL at 09:27

## 2019-01-03 RX ADMIN — GABAPENTIN SCH MG: 300 CAPSULE ORAL at 18:09

## 2019-01-03 RX ADMIN — OXYCODONE HYDROCHLORIDE PRN MG: 5 CAPSULE ORAL at 15:55

## 2019-01-03 RX ADMIN — OXYCODONE HYDROCHLORIDE AND ACETAMINOPHEN PRN TAB: 5; 325 TABLET ORAL at 23:58

## 2019-01-03 RX ADMIN — VANCOMYCIN HYDROCHLORIDE SCH MLS/HR: 1 INJECTION, POWDER, LYOPHILIZED, FOR SOLUTION INTRAVENOUS at 03:27

## 2019-01-03 NOTE — PN
Subjective


Date of Service: 01/03/19


Interval History: 


HOSPITALIST PROGRESS NOTE


Patient seen and examined at bedside. Care reviewed and d/w Alondra Richardson RN.


He is in good spirits today. Right stump pain is less intense, appetite is good.


Family History: Unchanged from Admission


Social History: Unchanged from Admission


Past Medical History: Unchanged from Admission





Objective


Active Medications: 








Acetaminophen (Tylenol Tab*)  650 mg PO Q4H PRN


   PRN Reason: FEVER/PAIN


Aspirin (Aspirin Ec Tab*)  81 mg PO DAILY [facility]


   Last Admin: 01/03/19 09:27 Dose:  81 mg


Device (Nicotine Mouth Piece*)  1 each INH ONCE PRN


   PRN Reason: CRAVINGS


Docusate Sodium (Colace Cap*)  100 mg PO BID PRN


   PRN Reason: CONSTIPATION


Enoxaparin Sodium (Lovenox(*))  40 mg SUBCUT Q24H [facility]


   Last Admin: 01/02/19 21:41 Dose:  40 mg


Folic Acid (Folvite Tab*)  1 mg PO DAILY [facility]


   Last Admin: 01/03/19 09:26 Dose:  1 mg


Gabapentin (Neurontin Cap(*))  300 mg PO QID [facility]


   Last Admin: 01/03/19 14:10 Dose:  300 mg


Vancomycin HCl 1,000 mg/ (Sodium Chloride)  250 mls @ 166.667 mls/hr IVPB Q8H 

[facility]; Protocol


   Last Admin: 01/03/19 12:18 Dose:  166.667 mls/hr


Sodium Chloride (Ns 0.9% 1000 Ml*)  1,000 mls @ 75 mls/hr IV PER RATE [facility]


Lisinopril (Prinivil Tab*)  2.5 mg PO DAILY [facility]


   Last Admin: 01/03/19 09:27 Dose:  2.5 mg


Lorazepam (Ativan Tab(*))  0 - 6 mg PO .PER [place] PROTOCOL [facility]; Protocol


Multivitamins/Minerals (Theragran/Minerals Tab*)  1 tab PO DAILY [facility]


   Last Admin: 01/03/19 09:27 Dose:  1 tab


Nicotine (Nicotine Inhaler*)  10 mg INH Q2H PRN


   PRN Reason: CRAVING


Nystatin (Nystatin Top Powder*)  1 applic TOPICAL BID PRN


   PRN Reason: RASH


   Last Admin: 01/02/19 00:24 Dose:  1 applic


Omeprazole (Prilosec Cap*)  20 mg PO DAILY@0730 [facility]


   Last Admin: 01/03/19 09:26 Dose:  20 mg


Oxycodone HCl (Roxycodone Tab*)  5 mg PO TID PRN


   PRN Reason: PAIN


   Last Admin: 01/03/19 01:24 Dose:  5 mg


Oxycodone/Acetaminophen (Percocet 5/325 Tab*)  1 tab PO Q4H PRN


   PRN Reason: Pain


   Last Admin: 01/01/19 20:45 Dose:  1 tab


Thiamine HCl (Vitamin B-1 Tab*)  100 mg PO DAILY [facility]


   Last Admin: 01/03/19 09:27 Dose:  100 mg


Trazodone HCl (Desyrel Tab*)  100 mg PO BEDTIME PRN


   PRN Reason: SLEEP








 Vital Signs - 8 hr











  01/03/19 01/03/19 01/03/19





  08:13 09:28 09:30


 


Temperature 98.0 F  


 


Pulse Rate 77  


 


Respiratory 18 16 18





Rate   


 


Blood Pressure 128/77  





(mmHg)   


 


O2 Sat by Pulse 95  





Oximetry   














  01/03/19 01/03/19





  12:35 14:10


 


Temperature 98.0 F 


 


Pulse Rate 79 


 


Respiratory 20 16





Rate  


 


Blood Pressure 136/60 





(mmHg)  


 


O2 Sat by Pulse 96 





Oximetry  











Oxygen Devices in Use Now: None


Appearance: Elderly disheveled gentleman sitting up in recliner in [employer].


Eyes: No Scleral Icterus


Ears/Nose/Mouth/Throat: Mucous Membranes Moist


Neck: Trachea Midline


Respiratory: Symmetrical Chest Expansion and Respiratory Effort, Clear to 

Auscultation


Cardiovascular: RRR - Normal S1 and S2


Extremities: - - Right stump shows less edema and erythema


Neurological: Alert and Oriented x 3, - - Very [place]


Result Diagrams: 


 01/02/19 06:23





 01/03/19 05:17


Microbiology and Other Data: 


 Microbiology











 01/02/19 03:15 Nasal Screen MRSA (PCR) - Final





 Nasal    Mrsa Detected














Assess/Plan/Problems-Billing


Assessment: 


Mr Bowden is a 69yo M with PMH of ETOH abuse, CAD, GI bleed, back pain, 

COPD, PVD, GI bleed, Factor V Leiden, right AKA, who presented to ED with c/o 

right thigh pain, found to have right thigh pressure ulcer with surrounding 

cellulitis and severe hyponatremia.





- Patient Problems


(1) Hyponatremia


Comment: - Severe hyponatremia - likely beer potomania on top of SIADH.


- Improving - sodium up to 133 - will d/c IVF.   





(2) Pressure ulcer


Comment: - Posterior right thigh stage 2 pressure ulcer, present on admission.


- Wound care appreciated - cover with Duoderm.   





(3) Cellulitis


Comment: - Right posterior thigh - wound culture grew MRSA - continue 

Vancomycin.


- US was negative for DVT.


- ID consult requested.   





(4) Alcoholism


Comment: - No evidence of withdrawal at this time.


- Continue WAM protocol.   





(5) Chronic pain


Comment: - Neuropathic in nature - continue Gabapentin.   





(6) DVT prophylaxis


Comment: - Lovenox.   





(7) Full code status








(8) Physical deconditioning


Comment: - PT evaluation.


- Patient will benefit of SNF as he's having difficulty caring for self at 

home.   


Status and Disposition: 


Inpatient.

## 2019-01-03 NOTE — CONS
CONSULTATION REPORT:

 

DATE OF CONSULT:  19

 

REQUESTING PHYSICIAN:  Dr. Serna.

 

CONSULTING SERVICE:  Infectious Disease.

 

REASON FOR CONSULT:  Right thigh stump infection.

 

IMPRESSION:

1.  Status post right above-the-knee amputation and now decubitus ulceration of 
the right posterior thigh with surrounding cellulitis.  Wound culture grew 
methicillin- resistant Staphylococcus aureus.  His leg is overall improving.

2.  Alcohol abuse on date of admission.

3.  Status post right hip arthroplasty.

4.  Coronary artery disease.

 

RECOMMENDATIONS:  We will continue vancomycin here probably another 24 hours 
and then as long as he is continuing to improve, we can change antibiotics to 
Bactrim as isolate is tetracycline resistant.

 

HISTORY OF PRESENT ILLNESS:  This is a 68-year-old man who had had his right 
leg amputated above the knee after a chronic right knee hardware infection that 
is all healed up and then was brought into the hospital by his son for 
increased alcohol use and has complained of increased right leg pain and 
swelling.  Ultrasound showed no DVT, but he was found to have a decubitus 
ulceration, was cultured, growing MRSA.  He was started on vancomycin.  He 
thinks the pain and swelling is much improved over the last day and half.  His 
white count obtained on admission down to 8000 yesterday.

 

PAST MEDICAL HISTORY:

1.  Right knee arthroplasty and infection, status post above-the-knee 
amputation.

2.  Right hip open reduction internal fixation.

3.  Hernia repair.

4.  Alcohol abuse.

5.  Coronary artery disease and MI.

6.  Osteoporosis.

7.  COPD.

8.  Thrombocytopenia.

9.  Peripheral vascular disease.

10.  Hyponatremia.

11.  GI bleed.

12.  Factor V Leiden deficiency.

 

ALLERGIES:  FENTANYL.

 

MEDICATIONS:

1.  Tylenol.

2.  Aspirin.

3.  Enoxaparin.

4.  Folic acid.

5.  Gabapentin.

6.  Lisinopril.

7.  Multivitamin.

8.  Nicotine inhaler.

9.  Omeprazole.

10.  Thiamine.

11.  Trazodone.

12.  Vancomycin 1 g IV every 8 hours.

 

SOCIAL HISTORY:  He lives on his own.  Smokes tobacco.  Drinks 4 beers a day.  
No injection drugs.

 

FAMILY HISTORY:  Brother with heart disease,  at age 50 from same.  
Brother and parents both have lung cancer.

 

REVIEW OF SYSTEMS:  A 14-point review is all negative as noted above in the 
history of present illness.

 

PHYSICAL EXAM:  Vital Signs:  Temperature 36.7, heart rate 70, respiratory rate 
18, blood pressure 128/77, oxygen saturation 95% on room air.  In general, he 
is awake, not in distress.  Neurologic:  He is oriented x3.  Follows all 
commands.  HEENT: There is no conjunctival hemorrhage.  Oropharynx without 
lesions.  Neck:  Neck is supple without mass.  Heart is regular rate and rhythm 
without murmurs, rubs, or gallops.  Lungs are clear to auscultation 
bilaterally.  Abdomen:  Soft, nontender, nondistended.  Bowel sounds present.  
Skin:  There are no rash or hemorrhage. Musculoskeletal:  Right above-knee 
amputation site is well healed.  There is a right posterior thigh area of 
devitalized skin with the surrounding edema, mild tenderness, and erythema. 



LABORATORY DATA:  White blood cell count 8, hemoglobin 10, platelets 217, 
creatinine 0.5.

 

Please see impressions and recommendations outlined above.

 

Thank you for asking me to see Mr. Bowden in consultation.

 

 [id number]/[facility] #: [id number]

RUBIO

## 2019-01-04 LAB
ALBUMIN SERPL BCG-MCNC: 3.6 G/DL (ref 3.2–5.2)
ALBUMIN/GLOB SERPL: 0.9 {RATIO} (ref 1–3)
ALP SERPL-CCNC: 84 U/L (ref 34–104)
ALT SERPL W P-5'-P-CCNC: 4 U/L (ref 7–52)
ANION GAP SERPL CALC-SCNC: 6 MMOL/L (ref 2–11)
AST SERPL-CCNC: 13 U/L (ref 13–39)
BUN SERPL-MCNC: 4 MG/DL (ref 6–24)
BUN/CREAT SERPL: 7.5 (ref 8–20)
CALCIUM SERPL-MCNC: 8.9 MG/DL (ref 8.6–10.3)
CHLORIDE SERPL-SCNC: 100 MMOL/L (ref 101–111)
GLOBULIN SER CALC-MCNC: 3.8 G/DL (ref 2–4)
GLUCOSE SERPL-MCNC: 119 MG/DL (ref 70–100)
HCO3 SERPL-SCNC: 26 MMOL/L (ref 22–32)
HCT VFR BLD AUTO: 33 % (ref 42–52)
HGB BLD-MCNC: 10.5 G/DL (ref 14–18)
MAGNESIUM SERPL-MCNC: 1.7 MG/DL (ref 1.9–2.7)
MCH RBC QN AUTO: 22 PG (ref 27–31)
MCHC RBC AUTO-ENTMCNC: 32 G/DL (ref 31–36)
MCV RBC AUTO: 70 FL (ref 80–94)
PLATELET # BLD AUTO: 210 10^3/UL (ref 150–450)
POTASSIUM SERPL-SCNC: 4.1 MMOL/L (ref 3.5–5)
PROT SERPL-MCNC: 7.4 G/DL (ref 6.4–8.9)
RBC # BLD AUTO: 4.76 10^6/UL (ref 4–5.4)
SODIUM SERPL-SCNC: 132 MMOL/L (ref 135–145)
WBC # BLD AUTO: 8.3 10^3/UL (ref 3.5–10.8)

## 2019-01-04 RX ADMIN — OXYCODONE HYDROCHLORIDE AND ACETAMINOPHEN PRN TAB: 5; 325 TABLET ORAL at 20:12

## 2019-01-04 RX ADMIN — ASPIRIN SCH MG: 81 TABLET, COATED ORAL at 09:07

## 2019-01-04 RX ADMIN — GABAPENTIN SCH MG: 300 CAPSULE ORAL at 17:47

## 2019-01-04 RX ADMIN — ENOXAPARIN SODIUM SCH MG: 40 INJECTION SUBCUTANEOUS at 20:12

## 2019-01-04 RX ADMIN — GABAPENTIN SCH MG: 300 CAPSULE ORAL at 20:12

## 2019-01-04 RX ADMIN — LISINOPRIL SCH MG: 5 TABLET ORAL at 09:08

## 2019-01-04 RX ADMIN — Medication SCH MG: at 09:08

## 2019-01-04 RX ADMIN — VANCOMYCIN HYDROCHLORIDE SCH MLS/HR: 1 INJECTION, POWDER, LYOPHILIZED, FOR SOLUTION INTRAVENOUS at 03:06

## 2019-01-04 RX ADMIN — OXYCODONE HYDROCHLORIDE PRN MG: 5 CAPSULE ORAL at 09:08

## 2019-01-04 RX ADMIN — GABAPENTIN SCH MG: 300 CAPSULE ORAL at 14:44

## 2019-01-04 RX ADMIN — THERA TABS SCH TAB: TAB at 09:07

## 2019-01-04 RX ADMIN — FOLIC ACID SCH MG: 1 TABLET ORAL at 09:09

## 2019-01-04 RX ADMIN — OMEPRAZOLE SCH MG: 20 CAPSULE, DELAYED RELEASE ORAL at 09:09

## 2019-01-04 RX ADMIN — GABAPENTIN SCH MG: 300 CAPSULE ORAL at 09:08

## 2019-01-04 RX ADMIN — ENOXAPARIN SODIUM SCH MG: 40 INJECTION SUBCUTANEOUS at 00:13

## 2019-01-04 NOTE — PN
Subjective


Date of Service: 01/04/19


Interval History: 


Pt seen and examined.  Meds and labs reviewed.  





CC: N/A





ROS:  Denied HA/dizziness, F/C, N/V, CP, SOB, increased cough, sputum production

, abd pain, diarrhea, constipation, dysuria, myalgias, arthralgias, throat pain

, and new skin lesions.  The rest of the 14 point ROS are unremarkable.





PHYSICAL EXAM:


GEN APPEARANCE: Awake, not in acute distress


HEENT: NC/AT, PERRLA, moist oral mucosa, (-) throat erythema


NECK: Soft, supple, (-) cervical LAD, (-)JVD


HEART: S1S2 WNL, RRR, No MRG


CHEST: CTA, BL, GAE, No R/R, intermittent mild wheeze


ABD: Soft, ND/NT, NABS 4x Q


EXT: No C/C/R. AKA


SKIN: Warm to touch


PSYCH: No active psychosis, hallucinations, depression, SI/HI


Family History: Unchanged from Admission


Social History: Unchanged from Admission


Past Medical History: Unchanged from Admission





Review of Systems





- Measurements


Intake and Output: 


Intake and Output Last 24 Hours











 01/02/19 01/03/19 01/04/19 01/05/19





 06:59 06:59 06:59 06:59


 


Intake Total 2446 1970 1805 1210


 


Output Total 1900 3465 2600 1530


 


Balance 546 -1491 -795 -320


 


Weight 169 lb 8 oz   


 


Intake:    


 


  IV Fluids 1926 550  


 


    ABX - VANCOMYCIN  250  


 


    NS (0.9%) 926 300  


 


  IVPB  250  


 


    ABX - VANCOMYCIN  250  


 


  Oral 520 1170 1805 1210


 


Output:    


 


  Urine 1900 3465 2600 1530


 


  Liquid Stool   0 


 


Other:    


 


  Estimated Void  Medium  


 


  # Bowel Movements   1 


 


  Estimated Stool Amount  Large Small 














Objective


Active Medications: 








Acetaminophen (Tylenol Tab*)  650 mg PO Q4H PRN


   PRN Reason: FEVER/PAIN


Aspirin (Aspirin Ec Tab*)  81 mg PO DAILY [facility]


   Last Admin: 01/04/19 09:07 Dose:  81 mg


Device (Nicotine Mouth Piece*)  1 each INH ONCE PRN


   PRN Reason: CRAVINGS


Docusate Sodium (Colace Cap*)  100 mg PO BID PRN


   PRN Reason: CONSTIPATION


Enoxaparin Sodium (Lovenox(*))  40 mg SUBCUT Q24H [facility]


   Last Admin: 01/04/19 00:13 Dose:  40 mg


Folic Acid (Folvite Tab*)  1 mg PO DAILY [facility]


   Last Admin: 01/04/19 09:09 Dose:  1 mg


Gabapentin (Neurontin Cap(*))  300 mg PO QID [facility]


   Last Admin: 01/04/19 14:44 Dose:  300 mg


Lisinopril (Prinivil Tab*)  2.5 mg PO DAILY [facility]


   Last Admin: 01/04/19 09:08 Dose:  2.5 mg


Lorazepam (Ativan Tab(*))  0 - 6 mg PO .PER [facility] PROTOCOL [facility]; Protocol


Melatonin (Melatonin)  6 mg PO BEDTIME [facility]; Protocol


   Last Admin: 01/03/19 20:02 Dose:  6 mg


Multivitamins/Minerals (Theragran/Minerals Tab*)  1 tab PO DAILY [facility]


   Last Admin: 01/04/19 09:07 Dose:  1 tab


Nicotine (Nicotine Inhaler*)  10 mg INH Q2H PRN


   PRN Reason: CRAVING


Nystatin (Nystatin Top Powder*)  1 applic TOPICAL BID PRN


   PRN Reason: RASH


   Last Admin: 01/02/19 00:24 Dose:  1 applic


Omeprazole (Prilosec Cap*)  20 mg PO DAILY@0730 [facility]


   Last Admin: 01/04/19 09:09 Dose:  20 mg


Oxycodone HCl (Roxycodone Tab*)  5 mg PO TID PRN


   PRN Reason: PAIN


   Last Admin: 01/04/19 09:08 Dose:  5 mg


Oxycodone/Acetaminophen (Percocet 5/325 Tab*)  1 tab PO Q4H PRN


   PRN Reason: Pain


   Last Admin: 01/03/19 23:58 Dose:  1 tab


Thiamine HCl (Vitamin B-1 Tab*)  100 mg PO DAILY [facility]


   Last Admin: 01/04/19 09:08 Dose:  100 mg


Trazodone HCl (Desyrel Tab*)  100 mg PO BEDTIME PRN


   PRN Reason: SLEEP


Trimethoprim/Sulfamethoxazole (Bactrim Ds 800/160 Tab*)  1 tab PO ONCE ONE


   Stop: 01/05/19 09:01








 Vital Signs - 8 hr











  01/04/19 01/04/19 01/04/19





  09:06 09:08 11:55


 


Temperature 98.5 F  98.6 F


 


Pulse Rate 85  81


 


Respiratory 16 16 12





Rate   


 


Blood Pressure 123/63  138/77





(mmHg)   


 


O2 Sat by Pulse 97  97





Oximetry   














  01/04/19 01/04/19





  14:44 15:50


 


Temperature  98.4 F


 


Pulse Rate  81


 


Respiratory 18 16





Rate  


 


Blood Pressure  132/68





(mmHg)  


 


O2 Sat by Pulse  96





Oximetry  











Oxygen Devices in Use Now: None


Result Diagrams: 


 01/04/19 10:54





 01/04/19 10:54


Microbiology and Other Data: 


 Microbiology











 01/02/19 03:15 Nasal Screen MRSA (PCR) - Final





 Nasal    Mrsa Detected














Assess/Plan/Problems-Billing


Assessment: 


Mr Bowden is a 69yo M with PMH of ETOH abuse, CAD, GI bleed, back pain, 

COPD, PVD, GI bleed, Factor V Leiden, right AKA, who presented to ED with c/o 

right thigh pain, found to have right thigh pressure ulcer with surrounding 

cellulitis and severe hyponatremia.





- Patient Problems


(1) Hyponatremia


Current Visit: Yes   Status: Acute   Code(s): E87.1 - HYPO-OSMOLALITY AND 

HYPONATREMIA   SNOMED Code(s): 95105973


   Comment: 


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 Leo garcía


-Agree that pt is hypovolemic, however, will obtain FENa, urine sodium, urine 

Osm, and serum Osm, as well as TSH and Cortisol levels to confirm above 

suspicion


-Stable   





(2) Pressure ulcer


Current Visit: Yes   Status: Acute   Code(s): L89.90 - PRESSURE ULCER OF 

UNSPECIFIED SITE, UNSPECIFIED STAGE   SNOMED Code(s): 173768952


   Comment: - Posterior right thigh stage 2 pressure ulcer, present on 

admission.


- Wound care appreciated - cover with Duoderm.   





(3) Cellulitis


Current Visit: Yes   Status: Acute   Code(s): L03.90 - CELLULITIS, UNSPECIFIED 

  SNOMED Code(s): 000586573


   Comment: 


- Right posterior thigh - wound culture grew MRSA - continue Vancomycin.


- US was negative for DVT.


-Appreciate ID input; Continue vanco for now and consider transitioning to 

Bactrim later   





(4) Alcoholism


Current Visit: Yes   Status: Acute   Code(s): F10.20 - ALCOHOL DEPENDENCE, 

UNCOMPLICATED   SNOMED Code(s): 8272763


   Comment: - No evidence of withdrawal at this time.


- Continue WAM protocol.   





(5) Chronic pain


Current Visit: Yes   Status: Acute   Code(s): G89.29 - OTHER CHRONIC PAIN   

SNOMED Code(s): 37401996


   Comment: - Neuropathic in nature - continue Gabapentin.   





(6) DVT prophylaxis


Current Visit: Yes   Status: Acute   Code(s): ZPQ0900 -    SNOMED Code(s): 

350022456


   Comment: - Lovenox.   


Status and Disposition: 


-Pt refuses ARABELLA, therefore, will continue to observe and possible D/C in 1-2 

days


-For ambulatory sats in AM

## 2019-01-05 LAB
ALBUMIN SERPL BCG-MCNC: 3.7 G/DL (ref 3.2–5.2)
ALBUMIN/GLOB SERPL: 1.2 {RATIO} (ref 1–3)
ALP SERPL-CCNC: 73 U/L (ref 34–104)
ALT SERPL W P-5'-P-CCNC: 6 U/L (ref 7–52)
ANION GAP SERPL CALC-SCNC: 6 MMOL/L (ref 2–11)
AST SERPL-CCNC: 12 U/L (ref 13–39)
BUN SERPL-MCNC: 4 MG/DL (ref 6–24)
BUN/CREAT SERPL: 7.1 (ref 8–20)
CALCIUM SERPL-MCNC: 8.7 MG/DL (ref 8.6–10.3)
CHLORIDE SERPL-SCNC: 101 MMOL/L (ref 101–111)
GLOBULIN SER CALC-MCNC: 3 G/DL (ref 2–4)
GLUCOSE SERPL-MCNC: 108 MG/DL (ref 70–100)
HCO3 SERPL-SCNC: 27 MMOL/L (ref 22–32)
HCT VFR BLD AUTO: 33 % (ref 42–52)
HGB BLD-MCNC: 10.6 G/DL (ref 14–18)
MAGNESIUM SERPL-MCNC: 1.8 MG/DL (ref 1.9–2.7)
MCH RBC QN AUTO: 22 PG (ref 27–31)
MCHC RBC AUTO-ENTMCNC: 32 G/DL (ref 31–36)
MCV RBC AUTO: 71 FL (ref 80–94)
PLATELET # BLD AUTO: 220 10^3/UL (ref 150–450)
POTASSIUM SERPL-SCNC: 3.8 MMOL/L (ref 3.5–5)
PROT SERPL-MCNC: 6.7 G/DL (ref 6.4–8.9)
RBC # BLD AUTO: 4.72 10^6/UL (ref 4–5.4)
SODIUM SERPL-SCNC: 134 MMOL/L (ref 135–145)
WBC # BLD AUTO: 8.7 10^3/UL (ref 3.5–10.8)

## 2019-01-05 RX ADMIN — ENOXAPARIN SODIUM SCH MG: 40 INJECTION SUBCUTANEOUS at 20:03

## 2019-01-05 RX ADMIN — OMEPRAZOLE SCH MG: 20 CAPSULE, DELAYED RELEASE ORAL at 08:37

## 2019-01-05 RX ADMIN — THERA TABS SCH TAB: TAB at 08:37

## 2019-01-05 RX ADMIN — ASPIRIN SCH MG: 81 TABLET, COATED ORAL at 08:37

## 2019-01-05 RX ADMIN — GABAPENTIN SCH MG: 300 CAPSULE ORAL at 13:34

## 2019-01-05 RX ADMIN — Medication SCH MG: at 08:37

## 2019-01-05 RX ADMIN — LISINOPRIL SCH MG: 5 TABLET ORAL at 08:36

## 2019-01-05 RX ADMIN — GABAPENTIN SCH MG: 300 CAPSULE ORAL at 08:38

## 2019-01-05 RX ADMIN — GABAPENTIN SCH MG: 300 CAPSULE ORAL at 20:00

## 2019-01-05 RX ADMIN — FOLIC ACID SCH MG: 1 TABLET ORAL at 08:36

## 2019-01-05 RX ADMIN — GABAPENTIN SCH MG: 300 CAPSULE ORAL at 17:39

## 2019-01-05 NOTE — PN
Subjective


Date of Service: 01/05/19


Interval History: 


Pt seen and examined.  Meds and labs reviewed.  





CC: N/A





ROS:  Denied HA/dizziness, F/C, N/V, CP, SOB, increased cough, sputum production

, abd pain, diarrhea, constipation, dysuria, myalgias, arthralgias, throat pain

, and new skin lesions.  The rest of the 14 point ROS are unremarkable.





PHYSICAL EXAM:


GEN APPEARANCE: Awake, not in acute distress


HEENT: NC/AT, PERRLA, moist oral mucosa, (-) throat erythema


NECK: Soft, supple, (-) cervical LAD, (-)JVD


HEART: S1S2 WNL, RRR, No MRG


CHEST: CTA, BL, GAE, No W/R/R


ABD: Soft, ND/NT, NABS 4x Q


EXT: No C/C/R. AKA


SKIN: Warm to touch


PSYCH: No active psychosis, hallucinations, depression, SI/HI


Family History: Unchanged from Admission


Social History: Unchanged from Admission


Past Medical History: Unchanged from Admission





Objective


Active Medications: 








Acetaminophen (Tylenol Tab*)  650 mg PO Q4H PRN


   PRN Reason: FEVER/PAIN


Aspirin (Aspirin Ec Tab*)  81 mg PO DAILY [facility]


   Last Admin: 01/05/19 08:37 Dose:  81 mg


Device (Nicotine Mouth Piece*)  1 each INH ONCE PRN


   PRN Reason: CRAVINGS


   Last Admin: 01/05/19 11:28 Dose:  1 each


Docusate Sodium (Colace Cap*)  100 mg PO BID PRN


   PRN Reason: CONSTIPATION


Enoxaparin Sodium (Lovenox(*))  40 mg SUBCUT Q24H [facility]


   Last Admin: 01/04/19 20:12 Dose:  40 mg


Folic Acid (Folvite Tab*)  1 mg PO DAILY [facility]


   Last Admin: 01/05/19 08:36 Dose:  1 mg


Gabapentin (Neurontin Cap(*))  300 mg PO QID [facility]


   Last Admin: 01/05/19 17:39 Dose:  300 mg


Lisinopril (Prinivil Tab*)  2.5 mg PO DAILY [facility]


   Last Admin: 01/05/19 08:36 Dose:  2.5 mg


Lorazepam (Ativan Tab(*))  0 - 6 mg PO .PER [facility] PROTOCOL [facility]; Protocol


Melatonin (Melatonin)  6 mg PO BEDTIME [facility]; Protocol


   Last Admin: 01/04/19 20:12 Dose:  6 mg


Multivitamins/Minerals (Theragran/Minerals Tab*)  1 tab PO DAILY [facility]


   Last Admin: 01/05/19 08:37 Dose:  1 tab


Nicotine (Nicotine Inhaler*)  10 mg INH Q2H PRN


   PRN Reason: CRAVING


   Last Admin: 01/05/19 11:28 Dose:  10 mg


Nystatin (Nystatin Top Powder*)  1 applic TOPICAL BID PRN


   PRN Reason: RASH


   Last Admin: 01/02/19 00:24 Dose:  1 applic


Omeprazole (Prilosec Cap*)  20 mg PO DAILY@0730 [facility]


   Last Admin: 01/05/19 08:37 Dose:  20 mg


Oxycodone HCl (Roxycodone Tab*)  5 mg PO TID PRN


   PRN Reason: PAIN


   Last Admin: 01/04/19 09:08 Dose:  5 mg


Oxycodone/Acetaminophen (Percocet 5/325 Tab*)  1 tab PO Q4H PRN


   PRN Reason: Pain


   Last Admin: 01/04/19 20:12 Dose:  1 tab


Thiamine HCl (Vitamin B-1 Tab*)  100 mg PO DAILY [facility]


   Last Admin: 01/05/19 08:37 Dose:  100 mg


Trazodone HCl (Desyrel Tab*)  100 mg PO BEDTIME PRN


   PRN Reason: SLEEP








 Vital Signs - 8 hr











  01/05/19 01/05/19 01/05/19





  12:17 13:34 15:20


 


Temperature 98.1 F  97.8 F


 


Pulse Rate 78  81


 


Respiratory 18 18 16





Rate   


 


Blood Pressure 113/62  132/72





(mmHg)   


 


O2 Sat by Pulse 96  96





Oximetry   














  01/05/19 01/05/19





  17:18 17:39


 


Temperature  


 


Pulse Rate  


 


Respiratory 18 18





Rate  


 


Blood Pressure  





(mmHg)  


 


O2 Sat by Pulse  





Oximetry  











Oxygen Devices in Use Now: None


Result Diagrams: 


 01/05/19 05:43





 01/05/19 05:43


Microbiology and Other Data: 


 Microbiology











 01/02/19 03:15 Nasal Screen MRSA (PCR) - Final





 Nasal    Mrsa Detected














Assess/Plan/Problems-Billing


Assessment: 


Mr Bowden is a 67yo M with PMH of ETOH abuse, CAD, GI bleed, back pain, 

COPD, PVD, GI bleed, Factor V Leiden, right AKA, who presented to ED with c/o 

right thigh pain, found to have right thigh pressure ulcer with surrounding 

cellulitis and severe hyponatremia.





- Patient Problems


(1) Hyponatremia


Current Visit: Yes   Status: Acute   Code(s): E87.1 - HYPO-OSMOLALITY AND 

HYPONATREMIA   SNOMED Code(s): 42512331


   Comment: 


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 Leo garcía


-Stable


-Still waiting on urine labs ordered but likely too late by now as Sodium 

concentration almost normal   





(2) Pressure ulcer


Current Visit: Yes   Status: Acute   Code(s): L89.90 - PRESSURE ULCER OF 

UNSPECIFIED SITE, UNSPECIFIED STAGE   SNOMED Code(s): 783109847


   Comment: - Posterior right thigh stage 2 pressure ulcer, present on 

admission.


- Wound care appreciated - cover with Duoderm.   





(3) Cellulitis


Current Visit: Yes   Status: Acute   Code(s): L03.90 - CELLULITIS, UNSPECIFIED 

  SNOMED Code(s): 538450306


   Comment: 


- Right posterior thigh - wound culture grew MRSA - continue Vancomycin.


- US was negative for DVT.


-Appreciate ID input; Continue vanco for now and consider transitioning to 

Bactrim later   





(4) Alcoholism


Current Visit: Yes   Status: Acute   Code(s): F10.20 - ALCOHOL DEPENDENCE, 

UNCOMPLICATED   SNOMED Code(s): 4031421


   Comment: - No evidence of withdrawal at this time.


- Continue WAM protocol.   





(5) Chronic pain


Current Visit: Yes   Status: Acute   Code(s): G89.29 - OTHER CHRONIC PAIN   

SNOMED Code(s): 23803577


   Comment: - Neuropathic in nature - continue Gabapentin.   





(6) DVT prophylaxis


Current Visit: Yes   Status: Acute   Code(s): XLW2297 -    SNOMED Code(s): 

306798491


   Comment: - Lovenox.   


Status and Disposition: 


-Pt refuses ARABELLA, therefore, will continue to observe and possible D/C in 1-2 

days


-Continue PT and possible D/C in AM


-Touch base with ID

## 2019-01-06 VITALS — DIASTOLIC BLOOD PRESSURE: 60 MMHG | SYSTOLIC BLOOD PRESSURE: 121 MMHG

## 2019-01-06 RX ADMIN — Medication SCH MG: at 08:24

## 2019-01-06 RX ADMIN — FOLIC ACID SCH MG: 1 TABLET ORAL at 08:24

## 2019-01-06 RX ADMIN — OXYCODONE HYDROCHLORIDE AND ACETAMINOPHEN PRN TAB: 5; 325 TABLET ORAL at 08:24

## 2019-01-06 RX ADMIN — LISINOPRIL SCH MG: 5 TABLET ORAL at 08:25

## 2019-01-06 RX ADMIN — GABAPENTIN SCH MG: 300 CAPSULE ORAL at 12:39

## 2019-01-06 RX ADMIN — GABAPENTIN SCH MG: 300 CAPSULE ORAL at 08:24

## 2019-01-06 RX ADMIN — THERA TABS SCH TAB: TAB at 08:24

## 2019-01-06 RX ADMIN — ASPIRIN SCH MG: 81 TABLET, COATED ORAL at 08:25

## 2019-01-06 RX ADMIN — OMEPRAZOLE SCH MG: 20 CAPSULE, DELAYED RELEASE ORAL at 08:24

## 2019-01-06 NOTE — DS
CC:  Dr. Michelle Aguilar; Dr. Willy Juan; Dr. Zhang Arnold; Dr. Randi Nicolas.*

 

DISCHARGE SUMMARY:

 

DATE OF ADMISSION:  01/01/19

 

DATE OF DISCHARGE:  01/06/19

 

DISCHARGE DIAGNOSES:

1.  Severe hyponatremia, improved to almost normal, likely due to beer 
potomania.

2.  ______ ulcer.

3.  Cellulitis of the right posterior thigh, improved.

 

DISCHARGE MEDICATIONS:

1.  Aspirin 81 mg p.o. q. daily.

2.  Gabapentin 300 mg p.o. t.i.d. 3  Lisinopril 2.5 mg p.o. q. daily.

4.  Nicotine inhaler 10 mg inhalation q.2 hours p.r.n.

5.  Nystatin powder 1 application topically b.i.d.

6.  Oxycodone 5 mg p.o. t.i.d. p.r.n.

7.  Pantoprazole 40 mg p.o. q. daily.

8.  TMP-SMX 1 tab p.o. b.i.d. 800/160 mg tab for 5 more days, then stop.

9.  Trazodone 100 mg p.o. q.h.s.

10.  Duloxetine DR capsule 30 mg p.o. q. daily.

11.  Floranex tablets 2 tabs p.o. q. daily for 8 days.

12.  Ondansetron 4 mg p.o. q.6 p.r.n.

13.  Senna Plus 2 tabs p.o. b.i.d.

 

HISTORY OF PRESENT ILLNESS/HOSPITAL COURSE:  The patient is a 68-year-old 
 gentleman with a history of ETOH abuse, CAD status post MI and COPD, 
who was in his usual state of health until he started complaining of posterior 
thigh pain a few days prior to admission where his son called the ambulance due 
to his inability to control his pain where they also mentioned right posterior 
thigh pain as well as an area of an open sore that they were concerned about.  
On evaluation, he was diagnosed with a right posterior thigh open wound and 
cellulitis as well as severe hyponatremia likely due to beer potomania.  
Unfortunately, during his hospital stay, I had ordered urine lytes and urine 
osmolality and they were not sent or the patient was unwilling to have the 
laboratories drawn, and by the time the most recent laboratories done yesterday 
showed resolution or close to resolution of his hyponatremia at 134 and hence, 
while ordered, the urine lytes and laboratory tests to confirm a possible SIADH 
on top of beer potomania could not be confirmed, although this is the 
impression at the time of his admission, likely due to beer potomania as 
discussed above given the temporal sequence of events as well as its quick 
resolution to close to normal.  His cellulitis was initially treated on IV 
antibiotics and was then subsequently seen by Dr. Arnold who recommended 
completion of his antibiotic therapy with Bactrim and hence, he will be placed 
on 5 more days of Bactrim on discharge to complete a 10-day treatment therapy.  
He was also seen by Physical Therapy, who recommended that he be placed on 
subacute rehab on discharge.  Unfortunately, the patient declined this offer 
and for the past day and a half, we have been working on him physically and was 
once again reevaluated by physical therapist who recommended certain activities 
that will decrease the chances of fall and for him to recover his strength 
faster and hence, we will defer.  He had been advised to follow up and/or call 
his PCP within 3 days post DC. He was advised to follow up with Dr. Arnold 1 
week post DC and to call his office and make/confirm an appointment.  He had 
also been advised and is aware that it is recommended that he discharged to 
subacute rehab; however, given his refusal we will defer with the patient and 
hence the patient has been set up for an outpatient PT for discharge to home.  
He was advised that if his symptoms resume or develop new ones or feel unwell 
for any reason, to call his PCP first.  If her PCP cannot entertain him due to 
scheduling issues alone, he was advised to call [facility] if the 
issue is nonemergent.  He was advised to call my office regarding any questions
, concerns, or further clarifications regarding his discharge plans and/or 
prescriptions and to take his medications as prescribed.

 

The patient was also found to be hypomagnesemic and his magnesium has been 
replenished prior to his discharge; however, he is aware that he has a repeat 
magnesium level to be done as an outpatient in 3 days post DC and he will need 
to touch base with his primary care to see if he needs more supplementation.

 

REVIEW OF SYSTEMS:  The patient currently denies any headaches, dizziness, 
fevers, chills, nausea, vomiting, chest pain, shortness of breath, increased 
cough nor sputum production.  Denies any abdominal pain, diarrhea, constipation
, pain and/or increased frequency on urination, myalgias or arthralgias, throat 
pain or new skin lesions.  The rest of the 14-point review of systems are 
otherwise unremarkable.

 

PHYSICAL EXAMINATION:  Shows the most recent vital signs of records with BP of 
127/63, heart rate of 85 beats per minute, 18 per minute respiratory rate, 
saturating 97% on room air, temperature of 97.4 degrees Fahrenheit.  General 
Appearance:  The patient is awake, not in acute distress.  HEENT:  Normocephalic
, atraumatic.  PERRLA.  Extraocular muscles intact.  Negative for icterus.  
Moist oral mucosa.  Negative throat erythema.  Neck is soft, supple with no 
cervical lymphadenopathy, no JVD.  Heart:  S1, S2 within normal limits.  
Regular rate and rhythm.  No murmurs, rubs, or gallops.  Chest:  Clear to 
auscultation bilaterally. Good air entry.  No wheezes, rales, or rhonchi.  
Abdomen is soft, nondistended, nontender.  Normoactive bowel sounds x4 
quadrants.  Extremities:  No cyanosis, clubbing or edema, with a right AKA.  
Psychiatric:  No active psychosis, depression, suicidal or homicidal ideation.  
Skin is warm to touch.

 

TIME SPENT:  The total time spent evaluating the patient, reviewing pertinent 
data, and appropriate documentation is 65 minutes.

 

 [id number]/CPS #: [id number]

MTDD

## 2019-02-22 ENCOUNTER — HOSPITAL ENCOUNTER (INPATIENT)
Dept: HOSPITAL 25 - ED | Age: 69
LOS: 5 days | Discharge: NURSING, REHAB OR LONG-TERM CARE | DRG: 871 | End: 2019-02-27
Attending: HOSPITALIST | Admitting: HOSPITALIST
Payer: MEDICARE

## 2019-02-22 DIAGNOSIS — Z80.0: ICD-10-CM

## 2019-02-22 DIAGNOSIS — Z89.611: ICD-10-CM

## 2019-02-22 DIAGNOSIS — I25.10: ICD-10-CM

## 2019-02-22 DIAGNOSIS — Z88.6: ICD-10-CM

## 2019-02-22 DIAGNOSIS — D50.9: ICD-10-CM

## 2019-02-22 DIAGNOSIS — F03.90: ICD-10-CM

## 2019-02-22 DIAGNOSIS — E87.1: ICD-10-CM

## 2019-02-22 DIAGNOSIS — M81.0: ICD-10-CM

## 2019-02-22 DIAGNOSIS — K44.9: ICD-10-CM

## 2019-02-22 DIAGNOSIS — H91.91: ICD-10-CM

## 2019-02-22 DIAGNOSIS — Z98.42: ICD-10-CM

## 2019-02-22 DIAGNOSIS — J44.9: ICD-10-CM

## 2019-02-22 DIAGNOSIS — D68.51: ICD-10-CM

## 2019-02-22 DIAGNOSIS — K22.11: ICD-10-CM

## 2019-02-22 DIAGNOSIS — J69.0: ICD-10-CM

## 2019-02-22 DIAGNOSIS — K21.9: ICD-10-CM

## 2019-02-22 DIAGNOSIS — K22.9: ICD-10-CM

## 2019-02-22 DIAGNOSIS — Z98.41: ICD-10-CM

## 2019-02-22 DIAGNOSIS — I25.2: ICD-10-CM

## 2019-02-22 DIAGNOSIS — M19.90: ICD-10-CM

## 2019-02-22 DIAGNOSIS — Z82.49: ICD-10-CM

## 2019-02-22 DIAGNOSIS — F32.9: ICD-10-CM

## 2019-02-22 DIAGNOSIS — I73.9: ICD-10-CM

## 2019-02-22 DIAGNOSIS — F17.210: ICD-10-CM

## 2019-02-22 DIAGNOSIS — Z86.718: ICD-10-CM

## 2019-02-22 DIAGNOSIS — Z72.89: ICD-10-CM

## 2019-02-22 DIAGNOSIS — Z80.1: ICD-10-CM

## 2019-02-22 DIAGNOSIS — Z86.010: ICD-10-CM

## 2019-02-22 DIAGNOSIS — I50.42: ICD-10-CM

## 2019-02-22 DIAGNOSIS — A41.9: Primary | ICD-10-CM

## 2019-02-22 LAB
ALBUMIN SERPL BCG-MCNC: 4.1 G/DL (ref 3.2–5.2)
ALBUMIN/GLOB SERPL: 1.1 {RATIO} (ref 1–3)
ALP SERPL-CCNC: 90 U/L (ref 34–104)
ALT SERPL W P-5'-P-CCNC: 5 U/L (ref 7–52)
ANION GAP SERPL CALC-SCNC: 10 MMOL/L (ref 2–11)
ANION GAP SERPL CALC-SCNC: 11 MMOL/L (ref 2–11)
APTT PPP: 35.2 SECONDS (ref 26–36.3)
AST SERPL-CCNC: 13 U/L (ref 13–39)
BUN SERPL-MCNC: 4 MG/DL (ref 6–24)
BUN SERPL-MCNC: 5 MG/DL (ref 6–24)
BUN/CREAT SERPL: 10.2 (ref 8–20)
BUN/CREAT SERPL: 7.8 (ref 8–20)
CALCIUM SERPL-MCNC: 8.5 MG/DL (ref 8.6–10.3)
CALCIUM SERPL-MCNC: 9.4 MG/DL (ref 8.6–10.3)
CHLORIDE SERPL-SCNC: 85 MMOL/L (ref 101–111)
CHLORIDE SERPL-SCNC: 93 MMOL/L (ref 101–111)
FERRITIN SERPL IA-MCNC: 12.2 NG/ML (ref 24–336)
GLOBULIN SER CALC-MCNC: 3.8 G/DL (ref 2–4)
GLUCOSE SERPL-MCNC: 114 MG/DL (ref 70–100)
GLUCOSE SERPL-MCNC: 117 MG/DL (ref 70–100)
HCO3 SERPL-SCNC: 19 MMOL/L (ref 22–32)
HCO3 SERPL-SCNC: 24 MMOL/L (ref 22–32)
HCT VFR BLD AUTO: 35 % (ref 42–52)
HCT VFR BLD AUTO: 36 % (ref 42–52)
HGB BLD-MCNC: 10.8 G/DL (ref 14–18)
HGB BLD-MCNC: 11.4 G/DL (ref 14–18)
INR PPP/BLD: 0.98 (ref 0.77–1.02)
IRON SERPL-MCNC: 29 UG/DL (ref 50–212)
MAGNESIUM SERPL-MCNC: 1.5 MG/DL (ref 1.9–2.7)
MCH RBC QN AUTO: 22 PG (ref 27–31)
MCHC RBC AUTO-ENTMCNC: 32 G/DL (ref 31–36)
MCV RBC AUTO: 70 FL (ref 80–94)
PLATELET # BLD AUTO: 370 10^3/UL (ref 150–450)
POTASSIUM SERPL-SCNC: 3.7 MMOL/L (ref 3.5–5)
POTASSIUM SERPL-SCNC: 3.9 MMOL/L (ref 3.5–5)
PROT SERPL-MCNC: 7.9 G/DL (ref 6.4–8.9)
RBC # BLD AUTO: 5.18 10^6/UL (ref 4–5.4)
RBC UR QL AUTO: (no result)
SODIUM SERPL-SCNC: 120 MMOL/L (ref 135–145)
SODIUM SERPL-SCNC: 122 MMOL/L (ref 135–145)
TIBC SERPL-MCNC: 399 MCG/DL (ref 250–450)
TRANSFERRIN SERPL-MCNC: 285 MG/DL (ref 203–362)
TROPONIN I SERPL-MCNC: 0.03 NG/ML (ref ?–0.04)
WBC # BLD AUTO: 21 10^3/UL (ref 3.5–10.8)
WBC UR QL AUTO: (no result)

## 2019-02-22 PROCEDURE — 85025 COMPLETE CBC W/AUTO DIFF WBC: CPT

## 2019-02-22 PROCEDURE — 85014 HEMATOCRIT: CPT

## 2019-02-22 PROCEDURE — 99406 BEHAV CHNG SMOKING 3-10 MIN: CPT

## 2019-02-22 PROCEDURE — 36415 COLL VENOUS BLD VENIPUNCTURE: CPT

## 2019-02-22 PROCEDURE — 88312 SPECIAL STAINS GROUP 1: CPT

## 2019-02-22 PROCEDURE — 85610 PROTHROMBIN TIME: CPT

## 2019-02-22 PROCEDURE — 82728 ASSAY OF FERRITIN: CPT

## 2019-02-22 PROCEDURE — 85027 COMPLETE CBC AUTOMATED: CPT

## 2019-02-22 PROCEDURE — 99157 MOD SED OTHER PHYS/QHP EA: CPT

## 2019-02-22 PROCEDURE — 74177 CT ABD & PELVIS W/CONTRAST: CPT

## 2019-02-22 PROCEDURE — 83690 ASSAY OF LIPASE: CPT

## 2019-02-22 PROCEDURE — 71045 X-RAY EXAM CHEST 1 VIEW: CPT

## 2019-02-22 PROCEDURE — 93005 ELECTROCARDIOGRAM TRACING: CPT

## 2019-02-22 PROCEDURE — 88305 TISSUE EXAM BY PATHOLOGIST: CPT

## 2019-02-22 PROCEDURE — 87899 AGENT NOS ASSAY W/OPTIC: CPT

## 2019-02-22 PROCEDURE — 81015 MICROSCOPIC EXAM OF URINE: CPT

## 2019-02-22 PROCEDURE — 99283 EMERGENCY DEPT VISIT LOW MDM: CPT

## 2019-02-22 PROCEDURE — 84484 ASSAY OF TROPONIN QUANT: CPT

## 2019-02-22 PROCEDURE — 83735 ASSAY OF MAGNESIUM: CPT

## 2019-02-22 PROCEDURE — 80053 COMPREHEN METABOLIC PANEL: CPT

## 2019-02-22 PROCEDURE — 87086 URINE CULTURE/COLONY COUNT: CPT

## 2019-02-22 PROCEDURE — 83605 ASSAY OF LACTIC ACID: CPT

## 2019-02-22 PROCEDURE — 83550 IRON BINDING TEST: CPT

## 2019-02-22 PROCEDURE — 80320 DRUG SCREEN QUANTALCOHOLS: CPT

## 2019-02-22 PROCEDURE — 99156 MOD SED OTH PHYS/QHP 5/>YRS: CPT

## 2019-02-22 PROCEDURE — 81003 URINALYSIS AUTO W/O SCOPE: CPT

## 2019-02-22 PROCEDURE — 87040 BLOOD CULTURE FOR BACTERIA: CPT

## 2019-02-22 PROCEDURE — 80048 BASIC METABOLIC PNL TOTAL CA: CPT

## 2019-02-22 PROCEDURE — 85730 THROMBOPLASTIN TIME PARTIAL: CPT

## 2019-02-22 PROCEDURE — 85018 HEMOGLOBIN: CPT

## 2019-02-22 PROCEDURE — 83540 ASSAY OF IRON: CPT

## 2019-02-22 PROCEDURE — G0480 DRUG TEST DEF 1-7 CLASSES: HCPCS

## 2019-02-22 RX ADMIN — SUCRALFATE SCH GM: 1 SUSPENSION ORAL at 18:18

## 2019-02-22 RX ADMIN — SUCRALFATE SCH GM: 1 SUSPENSION ORAL at 20:00

## 2019-02-22 RX ADMIN — HYDROCODONE BITARTRATE AND ACETAMINOPHEN PRN TAB: 5; 325 TABLET ORAL at 19:49

## 2019-02-22 RX ADMIN — GUAIFENESIN SCH MG: 600 TABLET, EXTENDED RELEASE ORAL at 19:49

## 2019-02-22 RX ADMIN — PIPERACILLIN AND TAZOBACTAM SCH MLS/HR: 3; .375 INJECTION, POWDER, LYOPHILIZED, FOR SOLUTION INTRAVENOUS; PARENTERAL at 22:32

## 2019-02-22 RX ADMIN — GABAPENTIN SCH MG: 300 CAPSULE ORAL at 19:48

## 2019-02-22 NOTE — HP
CC:  Dr. Randi Nicolas *

 

HISTORY AND PHYSICAL:

 

DATE OF ADMISSION:  19

 

PRIMARY CARE PROVIDER:  Dr. Randi Nicolas.

 

MY ATTENDING WHILE IN THE HOSPITAL:  Dr. Adrianna Serna.* (DICTATED BY LEVY LO)

 

CHIEF COMPLAINT:  Coffee-ground emesis x1 day.

 

HISTORY OF PRESENT ILLNESS:  Mr. Bowden is a 68-year-old male with past 
medical history significant for alcohol abuse, myocardial infarction, history 
of GI bleeding associated with severe erosive esophagitis and chronic 
hyponatremia, who presents to the emergency department with severe vomiting of 
coffee-ground emesis and blood clots for 1 day in association with epigastric 
abdominal pain.  The patient has a history of this in , but his erosive 
esophagitis at the time was able to be resolved with treatment on repeat EGD in 
.  The patient until the day before had been in his normal state of health, 
though the patient is generally relatively sedentary and non-communicative per 
his family.  The patient had no fevers, chills, or chest pain.  The patient has 
chronic shortness of breath.  The patient has been vomiting approximately every 
10 to 15 minutes overnight.  The patient had been unable to take anything in by 
mouth and has felt that when he did take anything by mouth, that made his pain 
worse.  The patient became less responsive on the morning of 19 and his 
family activated EMS at that time. The patient was recently diagnosed with 
pneumonia approximately 3 weeks ago and treated with antibiotics.  The patient 
is generally uncooperative with medical care, but has been taking his meds per 
his son, who lives with him.  The patient was recently admitted to this 
institution with lower extremity cellulitis.  The patient has abdominal pain at 
the time of this interview, which he describes as epigastric, moderate intensity
, but is unable to describe the quality.  He denies any radiation.  The patient 
has no other respiratory symptoms.  In the emergency department, the patient 
was found to have an elevated white blood cell count of 21.0, slight anemia 
with a microcytic pattern consistent with iron efficiency, hyponatremia, and 
otherwise unremarkable labs.  The patient had an abdomen and pelvis CT, which 
showed inflammation of the esophagus concerning for possible esophageal cancer 
and a chest x-ray with lobar consolidation in the right lower lung.  The 
patient has been coughing significantly with his vomiting over the past day.  
Due to concern for upper GI bleeding, erosive esophagitis, and possible 
aspiration pneumonia, we are asked to follow the patient for admission to 
hospital.

 

PAST MEDICAL HISTORY:  Alcohol abuse, MI, heart failure, reduced ejection 
fraction, COPD, osteoporosis, peripheral vascular disease, coronary artery 
disease, chronic hyponatremia, factor V Leiden, erosive esophagitis with GI 
bleeding, femur fracture complicated by osteomyelitis necessitating right above-
knee amputation.

 

PAST SURGICAL HISTORY:  Right hip ORIF with revisions, hernia repair, right 
above- knee amputation.

 

MEDICATIONS:

1.  Trazodone 100 mg p.o. at bedtime as needed.

2.  Pantoprazole 40 mg p.o. daily.

3.  Neurontin 300 mg p.o. t.i.d.

4.  Duloxetine 30 mg p.o. daily.

5.  Norco 5/325 one tab p.o. t.i.d. as needed.

6.  Ipratropium albuterol sulfate 3 mL inhalation q.i.d. as needed.

7.  Lisinopril 2.5 mg p.o. daily.

8.  Nystatin 1 application topical q.8 hours as needed.

9.  Aspirin 81 mg p.o. daily.

10.  Senna/docusate 1 tab p.o. daily as needed.

 

ALLERGIES:  FENTANYL.

 

FAMILY HISTORY:  The patient's father had lung cancer and esophageal cancer.  
The patient's mother had lung cancer.  The patient had a brother who  of an 
MI. Brother  of lung cancer and a son who  of complications of 
cirrhosis.  This patient has another son who has severe acid reflux, but who is 
otherwise alive and well.

 

SOCIAL HISTORY:  The patient smokes approximately 7 to 8 cigarettes a day.  The 
patient attempted to quit, but only was successful in doing so for 2 months.  
The patient quit drinking 4 months ago, but has a significant history of 
alcohol abuse. The patient denies having illicit drug use.  The patient has 
worked at [employer], but has been on disability since he is 39 years old.  The 
patient reports he has one child.  The patient's surrogate decision maker will 
be his son, Mushtaq Bowden.

 

REVIEW OF SYSTEMS:  A 14-point review of systems was reviewed with the patient 
and is negative except as above in the HPI, except the patient has been 
complaining of urinary frequency and a feeling of incomplete emptying.

 

                               PHYSICAL EXAMINATION

 

GENERAL:  The patient is a 68-year-old male who appears much older than stated 
age and sitting in the bed in moderate distress from abdominal pain, slightly 
increased work of breathing.

 

VITAL SIGNS:  At the time of evaluation, temperature 96.2, pulse rate 96, 
respiratory rate 18, oxygen saturate 93% on room air, blood pressure 131/62.

 

HEENT:  Head normocephalic, atraumatic.  Sclerae anicteric.  No conjunctival 
injection.  Nasal mucosa dry.  No pharyngeal erythema, discharge, or exudate.

 

NECK:  Supple, nontender.  No lymphadenopathy.  No carotid bruit auscultated.  
No JVD.

 

RESPIRATORY:  Severely diminished breath sounds.  No wheezes, rales, or rhonchi.

 

CARDIAC:  Tachycardic.  No clicks, murmurs, gallops, or rubs.  Distant heart 
sounds, likely due to COPD.

 

ABDOMEN:  Soft, tender to palpation over the epigastric area.  Hypertympanic to 
percussion over the epigastric area as well.  Tympanic to percussion, 
otherwise. Bowel sounds present and normoactive in all 4 quadrants.  No 
hepatosplenomegaly. No abdominal bruits auscultated.  No hepatojugular reflux.

 

GENITOURINARY:  No suprapubic or CVA tenderness.  Bladder nonpalpable.

 

NEUROLOGIC:  Cranial nerves II through XII intact.  No focal deficits.  The 
patient is alert and oriented x3, but drowsy.

 

SKIN:  The patient's right leg is surgically absent below the knee.

 

PSYCHIATRIC:  Pleasant and cooperative.

 

 LABORATORY DATA:  White blood cell count 12.1, hemoglobin 11.4, hematocrit 36, 
MCV 70, MCH 22, platelet count 370.  Sodium 120, potassium 3.9, chloride 85, 
carbon dioxide 24, anion gap 11, BUN 5, creatinine 0.49, glucose 114, calcium 
9.4. Bilirubin 0.8, AST 13, ALT 5, alkaline phosphatase 90.  Troponin I 0.03.  
Protein 7.9, albumin 4.1, globulin 3.8, lipase 11, serum alcohol less than 10.

 

DIAGNOSTIC STUDIES:  Abdomen and pelvis CT read as chronic or recurrent diffuse 
severe esophagitis with paraesophageal inflammatory change versus long segment 
infiltrating esophageal tumor with mediastinal expansion, correlate with 
clinical assessment, consider endoscopy for further evaluation, cholelithiasis 
without additional CT abnormality of the gallbladder, normal appendix and 
documented colonic diverticulosis without findings of the acute diverticulitis.
  Negative for ascites.  Negative for lymphadenopathy.  Negative for 
obstructive uropathy.

 

Chest x-ray read as patchy right lower lung consolidation requiring followup 
until resolution to exclude underlying pulmonary parenchymal pathology. 
Electrocardiogram shows normal sinus rhythm, normal axis, incomplete right 
bundle- branch block, normal R-wave progression, rate 84, QTc of 551.

 

ASSESSMENT AND PLAN:  Impression:  Mr. Bowden is a 68-year-old male with a 
complicated past medical history significant for myocardial infarction, heart 
failure, reduced ejection fraction, chronic hyponatremia, and history of severe 
erosive esophagitis with upper gastrointestinal bleeding, who presents to the 
emergency department with nausea and vomiting for 1 day with coffee-ground 
emesis and signs of recurrent erosive esophagitis on CT examination of the 
abdomen.  The patient was admitted to the hospital for management of upper 
gastrointestinal bleeding and erosive esophagitis with GI consultation, 
possible EGD, as well as treatment for likely aspiration pneumonia in 
association with his vomiting.

 

1.  Likely erosive esophagitis causing upper gastrointestinal bleeding.  The 
patient has a history of severe erosive esophagitis.  The patient has been on 
pantoprazole 40 mg daily.  The patient, however, has evidence of worsening 
esophagitis on his CT scan.  The patient is started on pantoprazole drip, 
sucralfate suspension.  The patient will be n.p.o.  The patient also will have 
famotidine IV.  The patient will be seen in consultation by Dr. Forest Campbell 
of Gastroenterology with consideration for an endoscopy with possible biopsies 
to assess for esophageal cancer with concerns on the abdomen.  The patient will 
have antiemetics as needed; however, concern will be exercised with this given 
patient's prolonged QT interval.  The patient will have Tigan initially with a 
repeat EKG in the morning.  We will attempt to replace patient's electrolytes 
if indicated. Magnesium is currently pending.

2.  Pneumonia with sepsis.  The patient has a patchy infiltrate on chest x-ray 
consistent with aspiration pneumonia. The patient is tachycardic and has 
leukocytosis.  The patient will be started on Zosyn and azithromycin for 
empiric coverage of aspiration pneumonia as well as Legionella.  The patient 
will have urinary antigens.  The patient has received 2 L of fluid while in the 
emergency department.  The patient will have 500 mL more to complete his 30 mL/
kg bolus.  The patient will not receive additional fluids after that due to 
reduced ejection fraction.  He will be monitored closely for hypotension.  The 
patient will have 2 IVs in case he needs further fluid resuscitation or blood 
products.

3.  Anemia.  The patient has what appears to be chronic iron deficiency anemia, 
which is consistent with his baseline.  We will repeat iron panel at this time. 
The patient may need IV iron if his blood cultures come back negative.  The 
patient is not on iron supplementation at home.

4.  Heart failure, reduced ejection fraction.  The patient is not on full 
treatment for this.  We will hold lisinopril at this time.  The patient's more 
recent EF was 40 to 45%.  If the patient begins to show signs of fluid overload
, we will repeat echocardiogram.

5.  Coronary artery disease.  Hold patient's aspirin.  The patient is not on a 
statin at home.  The patient's most recent LDL in 2017 was 64.  We will not 
start statin therapy at this time.

6.  Factor V Leiden.  Due to his concern for GI bleed, the patient will not 
have DVT prophylaxis.  The patient is a heterozygote and has not been on 
anticoagulation for a long period due to GI bleeding.

7.  Hyponatremia.  The patient has a sodium of 120, which has slightly 
decreased from his baseline.  Differential for this includes a poor solute 
intake, which has been the previous cause of his hyponatremia; however, SIADH 
from vomiting is also on differential.  The patient received a normal saline 
bolus while in the emergency department.  We will recheck a short-interval BMP 
to assess for changes in his sodium.  The patient will be monitored closely for 
changes in mental status.

8.  DVT prophylaxis held in the setting of gastrointestinal bleeding.

9.  FEN.  The patient will be n.p.o. except for medications, pending possible 
EGD and GI consultation.  The patient is on fluids as above.

10.  Disposition.  The patient is admitted inpatient.

11.  Estimated length of stay is greater than 2 days.

 

TIME SPENT:  Approximately 75 minutes spent on the admission of this patient, 
45 of which was spent face-to-face with the patient obtaining history and 
physical and discussing treatment plan.

 

The plan was discussed with my attending, Dr. Adrianna Serna, and she is in 
agreement.

 

 ____________________________________ LEVY LO

 

[id number]/CPS #: [id number]

RUBIO

## 2019-02-22 NOTE — ED
Nausea/Vomiting/Diarrhea HPI





- HPI Summary


HPI Summary: 


Patient is a 68-year-old male who presents emergency department for abdominal 

pain and vomiting times one week.  She denies diarrhea, fever, cough, chest pain

, shortness of breath.  Patient states he resides at home with his son.  Pt. 

denies a hx of ETOH abuse but it appears pt. was admitted last month for 

alcoholism related complications. Symptoms are moderate in severity. No current 

modifying factors.





- History of Current Complaint


Chief Complaint: EDNauseaVomitDiarrh


Stated Complaint: GENERAL ILLNESS


Time Seen by Provider: 02/22/19 11:14


Hx Obtained From: Patient


Pain Intensity: 0





- Allergies/Home Medications


Allergies/Adverse Reactions: 


 Allergies











Allergy/AdvReac Type Severity Reaction Status Date / Time


 


fentanyl Allergy  Itching Verified 01/01/19 15:59











Home Medications: 


 Home Medications





Aspirin EC TAB* [Ecotrin EC Low Dose 81 MG*] 81 mg PO DAILY 02/22/19 [History 

Confirmed 02/22/19]


HYDROcodone/ACETAMIN 5-325 MG* [Norco 5-325 TAB*] 1 tab PO TID PRN 02/22/19 [

History Confirmed 02/22/19]


Ipratropium/Albuterol Sulfate [Iprat-Albut 0.5-3(2.5) mg/3 ml] 3 ml INH QID PRN 

02/22/19 [History Confirmed 02/22/19]


Lisinopril TAB* [Prinivil TAB*] 2.5 mg PO DAILY 02/22/19 [History Confirmed 02/ 22/19]


Nystatin TOP POWDER* 1 applic TOPICAL Q8HR PRN 02/22/19 [History Confirmed 02/22 /19]


Sennosides/Docusate Sodium [Ra P-Col Rite Tablet] 1 tab PO DAILY PRN 02/22/19 [

History Confirmed 02/22/19]











PMH/Surg Hx/FS Hx/Imm Hx


Endocrine/Hematology History: Reports: Hx Blood Disorders - factor 5 clotting 

disorder


   Denies: Hx Anticoagulant Therapy, Hx Diabetes, Hx Thyroid Disease


Cardiovascular History: Reports: Hx Angina, Hx Coronary Artery Disease, Hx Deep 

Vein Thrombosis, Hx Myocardial Infarction, Hx Peripheral Vascular Disease, 

Other Cardiovascular Problems/Disorders - fACTOR V LEIDEN CLOTTING DISORDER


   Denies: Hx Cardiomegaly, Hx Congestive Heart Failure, Hx Hypertension, Hx 

Pacemaker/ICD, Hx Rheumatic Fever, Hx Valvular Heart Disease


Respiratory History: Reports: Hx Asthma, Hx Chronic Obstructive Pulmonary 

Disease (COPD), Other Respiratory Problems/Disorders - Longterm smoker


   Denies: Hx Pulmonary Edema, Hx Pulmonary Embolism


GI History: Reports: Hx Gastroesophageal Reflux Disease, Other GI Disorders - 

HX OF GI BLEED - NO PROBLEMS NOW


   Denies: Hx Cirrhosis, Hx Crohn's Disease, Hx Hiatal Hernia, Hx Irritable 

Bowel, Hx Jaundice, Hx Ulcer


 History: 


   Denies: Hx Renal Disease, Other  Problems/Disorders


Musculoskeletal History: Reports: Hx Arthritis, Hx Back Problems, Hx 

Osteoporosis, Other Musculoskeletal History - Fx hip sp fall at home hx


   Denies: Hx Rheumatoid Arthritis, Hx Bursitis


Sensory History: Reports: Hx Cataracts - HAVING CATARACT SURGERY, Hx Contacts 

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


   Denies: Hx Hearing Aid


Opthamlomology History: Reports: Hx Cataracts - HAVING CATARACT SURGERY, Hx 

Contacts or Glasses - for reading


Neurological History: 


   Denies: Hx Headaches, Hx Migraine, Hx Seizures


   Comment Only: Other Neuro Impairments/Disorders - NEUROPATHY/HX SUBSTANCE 

ABUSE


Psychiatric History: Reports: Hx Depression, Hx Substance Abuse


   Denies: Hx Anxiety, Hx Panic Disorder





- Cancer History


Hx Chemotherapy: No





- Surgical History


Surgery Procedure, Year, and Place[place] [facility] [place]TH[place] 

April 2014; Right total knee replacement june 5 2014, [facility] [place].  right ear surgery, 

HERNIA REPAIR


Hx Anesthesia Reactions: No





- Immunization History


Date of Tetanus Vaccine: 2011


Date of Influenza Vaccine: None


Infectious Disease History: Unable to Obtain/Confirm


Infectious Disease History: Reports: Hx of Known/Suspected MRSA


   Denies: Hx Hepatitis, Traveled Outside the [place] in Last 30 Days





- Family History


Known Family History: Positive: Cardiac Disease, Other - CANCER





- Social History


Alcohol Use: Daily


Alcohol Amount: NOT SINCE NEW YEARS


Substance Use Type: Reports: None


Hx Tobacco Use: Yes


Smoking Status (MU): Heavy Every Day Tobacco Smoker


Type: Cigarettes


Amount Used/How Often: 1 - 2 PPD


Length of Time of Smoking/Using Tobacco: 50 YEARS


Have You Smoked in the Last Year: Yes





Review of Systems


Constitutional: Negative


Eyes: Negative


ENT: Negative


Cardiovascular: Negative


Respiratory: Negative


Positive: Abdominal Pain, Vomiting


Genitourinary: Negative


Musculoskeletal: Negative


Neurological: Negative


All Other Systems Reviewed And Are Negative: Yes





Physical Exam


Triage Information Reviewed: Yes


Vital Signs On Initial Exam: 


 Initial Vitals











Temp Pulse Resp BP Pulse Ox


 


 96.2 F   86   20   152/74   95 


 


 02/22/19 11:05  02/22/19 11:05  02/22/19 11:05  02/22/19 11:05  02/22/19 11:05











Vital Signs Reviewed: Yes


Appearance: Positive: Pain Distress - Pt. sitting up in bed, appears 

uncomfortable but nontoxic. Holding emesis bag with small amount of brownish 

liquid in it. Appears holding than stated age.


Skin: Positive: Warm, Dry


Head/Face: Positive: Normal Head/Face Inspection


Eyes: Positive: Normal, EOMI


Neck: Positive: Supple


Respiratory/Lung Sounds: Positive: Clear to Auscultation


Cardiovascular: Positive: Normal


Abdomen Description: Positive: Other: - Distended with diffuse tenderness.


Neurological: Positive: Normal, CN Intact II-III


Psychiatric: Positive: Affect/Mood Appropriate





Diagnostics





- Vital Signs


 Vital Signs











  Temp Pulse Resp BP Pulse Ox


 


 02/22/19 11:05  96.2 F  86  20  152/74  95














- Laboratory


Result Diagrams: 


 02/24/19 07:23





 02/24/19 07:23


Lab Statement: Any lab studies that have been ordered have been reviewed, and 

results considered in the medical decision making process.





Naus/Vom/Diarrhea Course/Dx





- Course


Course Of Treatment: Pt. presenting with abd. pain and vomiting. He is afebrile 

with stable VS. Pt. started on IV fluids and zofran. ECG nonacute. Labs show 

significant leukocytosis of 21k. Low Na, Cl, CO2, and ca. CXR shows lower lobe 

infiltrate per radiology. Pt. given a dose of zosyn. CT abd./pelvis per 

radiology: IMPRESSION:  #. Consider chronic or recurrent diffuse severe 

esophagitis with periesophageal.  inflammatory change versus long segment 

infiltrating esophageal tumor with mediastinal.  extension. Correlate with 

clinical assessment and consider endoscopy for further.  evaluation.  #. 

Cholelithiasis without additional CT abnormality of the gallbladder.  #. Normal 

appendix documented. Colonic diverticulosis without findings of acute.  

diverticulitis.  #. Negative for ascites.  #. Negative for lymphadenopathy.  #. 

Negative for obstructive uropathy.  Hospitalist consulted for admission. I 

spoke with Dr. Matthews and pt has been accepted to her service. They will 

consult GI and get endoscopy set up for further evaluation of potential 

esophageal mass. Pt. has had no further vomiting in ED and has remained stable.





- Differential Dx/Diagnosis


Differential Diagnoses - Male: MI, Bowel Obstruction, Diverticulitis, 

Pancreatitis, Gall Bladder Disease, Ureteral Calculi, Aspiration, 

Gastroenteritis (Viral), Gastroenteritis (Bacterial), Vomiting, Colitis


Provider Diagnosis: 


 Pneumonia, Esophageal mass








Discharge





- Sign-Out/Discharge


Documenting (check all that apply): Patient Departure


Patient Received Moderate/Deep Sedation with Procedure: No





- Discharge Plan


Condition: Stable


Disposition: ADMITTED TO [facility]





- Billing Disposition and Condition


Condition: STABLE


Disposition: Admitted to [facility]

## 2019-02-23 LAB
ANION GAP SERPL CALC-SCNC: 6 MMOL/L (ref 2–11)
ANION GAP SERPL CALC-SCNC: 8 MMOL/L (ref 2–11)
BASOPHILS # BLD AUTO: 0.1 10^3/UL (ref 0–0.2)
BUN SERPL-MCNC: 3 MG/DL (ref 6–24)
BUN SERPL-MCNC: 4 MG/DL (ref 6–24)
BUN/CREAT SERPL: 5.5 (ref 8–20)
BUN/CREAT SERPL: 6.7 (ref 8–20)
CALCIUM SERPL-MCNC: 8.4 MG/DL (ref 8.6–10.3)
CALCIUM SERPL-MCNC: 8.5 MG/DL (ref 8.6–10.3)
CHLORIDE SERPL-SCNC: 96 MMOL/L (ref 101–111)
CHLORIDE SERPL-SCNC: 98 MMOL/L (ref 101–111)
EOSINOPHIL # BLD AUTO: 0 10^3/UL (ref 0–0.6)
GLUCOSE SERPL-MCNC: 101 MG/DL (ref 70–100)
GLUCOSE SERPL-MCNC: 96 MG/DL (ref 70–100)
HCO3 SERPL-SCNC: 23 MMOL/L (ref 22–32)
HCO3 SERPL-SCNC: 24 MMOL/L (ref 22–32)
HCT VFR BLD AUTO: 33 % (ref 42–52)
HGB BLD-MCNC: 10.4 G/DL (ref 14–18)
LYMPHOCYTES # BLD AUTO: 1.1 10^3/UL (ref 1–4.8)
MAGNESIUM SERPL-MCNC: 1.9 MG/DL (ref 1.9–2.7)
MCH RBC QN AUTO: 22 PG (ref 27–31)
MCHC RBC AUTO-ENTMCNC: 31 G/DL (ref 31–36)
MCV RBC AUTO: 71 FL (ref 80–94)
MONOCYTES # BLD AUTO: 2 10^3/UL (ref 0–0.8)
NEUTROPHILS # BLD AUTO: 16.7 10^3/UL (ref 1.5–7.7)
NRBC # BLD AUTO: 0 10^3/UL
NRBC BLD QL AUTO: 0.1
PLATELET # BLD AUTO: 308 10^3/UL (ref 150–450)
POTASSIUM SERPL-SCNC: 3.6 MMOL/L (ref 3.5–5)
POTASSIUM SERPL-SCNC: 3.6 MMOL/L (ref 3.5–5)
RBC # BLD AUTO: 4.71 10^6/UL (ref 4–5.4)
SODIUM SERPL-SCNC: 127 MMOL/L (ref 135–145)
SODIUM SERPL-SCNC: 128 MMOL/L (ref 135–145)
WBC # BLD AUTO: 20 10^3/UL (ref 3.5–10.8)

## 2019-02-23 PROCEDURE — 0DB48ZX EXCISION OF ESOPHAGOGASTRIC JUNCTION, VIA NATURAL OR ARTIFICIAL OPENING ENDOSCOPIC, DIAGNOSTIC: ICD-10-PCS | Performed by: INTERNAL MEDICINE

## 2019-02-23 RX ADMIN — HYDROCODONE BITARTRATE AND ACETAMINOPHEN PRN TAB: 5; 325 TABLET ORAL at 12:29

## 2019-02-23 RX ADMIN — SUCRALFATE SCH GM: 1 SUSPENSION ORAL at 05:19

## 2019-02-23 RX ADMIN — GABAPENTIN SCH MG: 300 CAPSULE ORAL at 22:46

## 2019-02-23 RX ADMIN — GABAPENTIN SCH: 300 CAPSULE ORAL at 11:56

## 2019-02-23 RX ADMIN — PANTOPRAZOLE SODIUM SCH MLS/HR: 40 INJECTION, POWDER, FOR SOLUTION INTRAVENOUS at 22:53

## 2019-02-23 RX ADMIN — SUCRALFATE SCH GM: 1 SUSPENSION ORAL at 17:49

## 2019-02-23 RX ADMIN — PANTOPRAZOLE SODIUM SCH MLS/HR: 40 INJECTION, POWDER, FOR SOLUTION INTRAVENOUS at 05:51

## 2019-02-23 RX ADMIN — PIPERACILLIN AND TAZOBACTAM SCH MLS/HR: 3; .375 INJECTION, POWDER, LYOPHILIZED, FOR SOLUTION INTRAVENOUS; PARENTERAL at 05:18

## 2019-02-23 RX ADMIN — PIPERACILLIN AND TAZOBACTAM SCH: 3; .375 INJECTION, POWDER, LYOPHILIZED, FOR SOLUTION INTRAVENOUS; PARENTERAL at 05:18

## 2019-02-23 RX ADMIN — DULOXETINE HYDROCHLORIDE SCH MG: 30 CAPSULE, DELAYED RELEASE ORAL at 12:07

## 2019-02-23 RX ADMIN — PIPERACILLIN AND TAZOBACTAM SCH MLS/HR: 3; .375 INJECTION, POWDER, LYOPHILIZED, FOR SOLUTION INTRAVENOUS; PARENTERAL at 17:46

## 2019-02-23 RX ADMIN — GUAIFENESIN SCH MG: 600 TABLET, EXTENDED RELEASE ORAL at 22:46

## 2019-02-23 RX ADMIN — PIPERACILLIN AND TAZOBACTAM SCH MLS/HR: 3; .375 INJECTION, POWDER, LYOPHILIZED, FOR SOLUTION INTRAVENOUS; PARENTERAL at 12:09

## 2019-02-23 RX ADMIN — GABAPENTIN SCH MG: 300 CAPSULE ORAL at 15:20

## 2019-02-23 RX ADMIN — SUCRALFATE SCH GM: 1 SUSPENSION ORAL at 22:47

## 2019-02-23 RX ADMIN — PANTOPRAZOLE SODIUM SCH: 40 INJECTION, POWDER, FOR SOLUTION INTRAVENOUS at 19:51

## 2019-02-23 RX ADMIN — SUCRALFATE SCH GM: 1 SUSPENSION ORAL at 12:09

## 2019-02-23 RX ADMIN — GUAIFENESIN SCH MG: 600 TABLET, EXTENDED RELEASE ORAL at 12:07

## 2019-02-23 NOTE — PN
Subjective


Date of Service: 02/23/19


Interval History: 





Patient feeling better today. Patient denies abdominal pain, nausea, hematemesis

, diarrhea, constipation, or other pain. Patient is more alert than yesterday. 

Patient denies F/C, cough, SOB, lightheadedness, CP, or other pain. Patient is 

alert but has very little insight into his current situation. 


Family History: Unchanged from Admission


Social History: Unchanged from Admission


Past Medical History: Unchanged from Admission





Objective


Active Medications: 








Acetaminophen (Tylenol Tab*)  650 mg PO Q6H PRN


   PRN Reason: FEVER/PAIN


Hydrocodone Bitart/Acetaminophen (Norco 5-325 Tab*)  1 tab PO TID PRN


   PRN Reason: PAIN


   Last Admin: 02/23/19 12:29 Dose:  1 tab


Albuterol/Ipratropium (Duoneb (Albuterol 2.5 Mg/Ipratropium 0.5 Mg))  1 neb INH 

Q4H PRN


   PRN Reason: SOB/WHEEZING


Benzonatate (Tessalon Cap*)  100 mg PO BID PRN


   PRN Reason: COUGH


Duloxetine HCl (Cymbalta Cap*)  30 mg PO DAILY [facility]


   Last Admin: 02/23/19 12:07 Dose:  30 mg


Gabapentin (Neurontin Cap(*))  300 mg PO TID [facility]


   Last Admin: 02/23/19 11:56 Dose:  Not Given


Guaifenesin (Mucinex*)  1,200 mg PO BID [facility]


   Last Admin: 02/23/19 12:07 Dose:  1,200 mg


Azithromycin 500 mg/ Sodium (Chloride)  250 mls @ 250 mls/hr IVPB Q24H [facility]


   Last Admin: 02/22/19 18:18 Dose:  250 mls/hr


Pantoprazole Sodium (Protonix Iv Bag*)  80 mg in 250 mls @ 25 mls/hr IVPB Q10H 

[facility]


   Last Admin: 02/23/19 05:51 Dose:  25 mls/hr


Piperacillin Sod/Tazobactam (Sod 3.375 gm/ Sodium Chloride)  100 mls @ 200 mls/

hr IVPB 0000,0600,1200,1800 [facility]


   Last Admin: 02/23/19 12:09 Dose:  200 mls/hr


Nicotine (Nicotine Inhaler*)  10 mg INH Q2H PRN


   PRN Reason: CRAVING


Nystatin (Nystatin Top Powder*)  1 applic TOPICAL Q8HR PRN


   PRN Reason: ITCHING


Pharmacy Consult (Zosyn Per Pharmacy*)  1 note FOLLOW UP .ZOSYN PER PHARMACY [facility]


Sucralfate (Sucralfate Susp)  1 gm PO Q6H [facility]


   Last Admin: 02/23/19 12:09 Dose:  1 gm


Trazodone HCl (Desyrel Tab*)  100 mg PO BEDTIME PRN


   PRN Reason: SLEEP


Trimethobenzamide HCl (Tigan Cap*)  300 mg PO Q6H PRN


   PRN Reason: NAUSEA








 Vital Signs - 8 hr











  02/23/19 02/23/19





  07:37 12:29


 


Temperature 98.6 F 


 


Pulse Rate 93 


 


Respiratory 18 20





Rate  


 


Blood Pressure 112/55 





(mmHg)  


 


O2 Sat by Pulse 91 





Oximetry  











Oxygen Devices in Use Now: None


Appearance: Patient is a 69yo male who appears older than stated age and is 

sitting in the bed in NAD.


Eyes: No Scleral Icterus, PERRLA


Ears/Nose/Mouth/Throat: NL Teeth, Lips, Gums, Clear Oropharnyx, Mucous 

Membranes Moist


Neck: NL Appearance and Movements; NL JVP, Trachea Midline


Respiratory: Symmetrical Chest Expansion and Respiratory Effort, Clear to 

Auscultation


Cardiovascular: NL Sounds; No Murmurs; No JVD, RRR, No Edema


Abdominal: NL Sounds; No Tenderness; No Distention, No Hepatosplenomegaly


Lymphatic: No Cervical Adenopathy


Extremities: No Edema, No Clubbing, Cyanosis, - - Left leg surgically absent 

above knee. 


Skin: No Rash or Ulcers, No Nodules or Sclerosis


Neurological: NL Sensation, NL Muscle Strength and Tone


Result Diagrams: 


 02/23/19 06:08





 02/23/19 06:08


Microbiology and Other Data: 


 Microbiology











 02/22/19 19:15 Legionella Urinary Antigen - Final





 Urine    Negative Legionella Antigen





 Streptococcus pneumoniae Ag Screen - Final





    Negative S. pneumo Antigen














Assess/Plan/Problems-Billing


Assessment: 





Patient is a 69yo male with a PMH for GI Bleeding due to erosive esophagitis, 

HFrEF, COPD, Alcoholism, chronic hyponatremia, who presents to the hospital 

with severe nausea and vomiting for 1 day with hematemesis and was found to 

have recurrent severe erosive esophagitis. Patient also has hyponatremia and 

likely aspiriation pneumonia. 





- Patient Problems


(1) Erosive esophagitis


Current Visit: Yes   Status: Acute   Code(s): K22.10 - ULCER OF ESOPHAGUS 

WITHOUT BLEEDING   SNOMED Code(s): 04254049


   Comment: 


- Appreciate GI input, confirmed by EGD, Polyp, but no other signs of malignancy


- Continue Sucralfate and Protonix Drip


- Will need close follow up EGD


- Symptoms have subsided at this time. 


- Continue to avoid anticoagulation at this time.    





(2) Anemia


Current Visit: No   Status: Acute   Code(s): D64.9 - ANEMIA, UNSPECIFIED   

SNOMED Code(s): 515053168


   Comment: 


- Chronic microcytic


- Has Iron studies consistent with WALDEMAR


- Cannot supplement orally due to Erosive Esophagitis


- Start IV Iron when Blood Cultures negative. 


- Worsening with likely component of dilution and blood loss from esophagus.    





(3) CAD (coronary artery disease)


Current Visit: No   Status: Acute   Code(s): I25.10 - ATHSCL HEART DISEASE OF 

NATIVE CORONARY ARTERY W/O ANG PCTRS   SNOMED Code(s): 54375367


   Comment: 


- Asymptomatic.  


- Continue atorvastatin.


- Hold anticoagulants at this time.    





(4) COPD (chronic obstructive pulmonary disease)


Current Visit: No   Status: Acute   Code(s): J44.9 - CHRONIC OBSTRUCTIVE 

PULMONARY DISEASE, UNSPECIFIED   SNOMED Code(s): 45024425


   Comment: 


- No signs of exacerbation


- Cont spiriva and duonebs prn, monitor closely.   





(5) Chronic combined systolic and diastolic CHF (congestive heart failure)


Current Visit: No   Status: Acute   Code(s): I50.42 - CHRONIC COMBINED SYSTOLIC 

AND DIASTOLIC HRT FAIL   SNOMED Code(s): 638960497309997


   Comment: 


- Echo demonstrates EF 40-45%.


- Appears euvolemic   





(6) Factor V Leiden mutation


Current Visit: No   Status: Acute   Code(s): D68.51 - ACTIVATED PROTEIN C 

RESISTANCE   SNOMED Code(s): 011460674


   Comment: 


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


- Anticoagulation with caution if at all.    





(7) Aspiration pneumonia


Current Visit: Yes   Status: Acute   Code(s): J69.0 - PNEUMONITIS DUE TO 

INHALATION OF FOOD AND VOMIT   SNOMED Code(s): 153355117


   Comment: 


- Due to extreme vomiting


- Treat with Zosyn


- With associated sepsis which has now resolved


- No Current Respiratory symptoms.    





(8) Hyponatremia


Current Visit: No   Status: Acute   Code(s): E87.1 - HYPO-OSMOLALITY AND 

HYPONATREMIA   SNOMED Code(s): 62820101


   Comment: 


- Chronic but below baseline on admission


- Likely combination of solute deficiency and Transient SIADH with severe 

vomiting


- Stop fluids, recheck this PM to avoid overcorrection.    





(9) Full code status


Current Visit: No   Status: Acute   Code(s): Z78.9 - OTHER SPECIFIED HEALTH 

STATUS   SNOMED Code(s): 460382442


   


Status and Disposition: 





Inpatient for GI bleed.

## 2019-02-23 NOTE — CONS
GASTROENTEROLOGY CONSULT:

 

DATE:  02/23/19

 

CONSULTING PHYSICIAN:  LEVY Lazcano.

 

REASON FOR CONSULTATION:  Repetitive vomiting and coffee-ground emesis.

 

HISTORY:  This 68-year-old man with alcoholism, COPD, and continued 
intermittent abuse of those toxins was brought in with a couple of days of 
recurring vomiting. In the emergency room, he was found to have pulmonary 
infiltrates with a white count of 21,000, though he did not appear septic.  
Sodium was 122 with a history of prior low sodium at times, down into the mid 
120s recurrently and once on New Year Day's this year 118.

 

He has a history of GERD, treated with a PPI, though it is unclear at this time 
whether he has been taking it.  He is seen alone and is hard of hearing and has 
very limited medical insight.  His family is not present.  He lives with his 
son and daughter-in-law.

 

CT of the chest in the ER showed thickening of the esophagus and mediastinal 
abnormalities.  There is a history of esophageal cancer reportedly in his 
father. All this will need to be re-verified from old records and with his son.

 

Since admission, he has not had any further vomiting.  He has appeared 
comfortable and afebrile.

 

PAST MEDICAL HISTORY:

1.  Alcoholism - recurrently over the years and most recent elevation New Year'
s Day this year 226.  He has never had any alcoholic liver disease and his INR 
has been normal.  His ALT has been generally under 20.

2.  COPD - tobacco abuse.

3.  Factor V Leiden - recurring DVTs in the setting of complex right leg 
surgeries 4 to 8 years ago.

4.  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 
and peripheral vascular disease.

5.  History of coronary artery disease, status post coronary angiogram at 
[facility] around 2012.

6.  Chronic hyponatremia.

7.  Erosive GERD - healed up with 1 year of PPI treatment as documented by 1-
year followup endoscopy by Dr. Hurd in 2014.

8.  Colon polyps - 4 removed in 2012 by Dr. Grover.  One was a tubulovillous 
adenoma on the left.

9.  Status post right hip fracture surgery, then prosthesis and revisions.

10.  Hard of hearing.

 

MEDICATIONS:  Per medication reconciliation at home, he is to be on 
pantoprazole 40 mg, lisinopril 2.5 mg, aspirin 81 mg, trazodone 100 mg h.s., 
Neurontin 100 mg t.i.d., duloxetine 30 mg, Norco 5/325, senna and docusate.  He 
did make the comment, however, that he is not taking all of them as they are 
too weak anyway.

 

SOCIAL HISTORY:  He worked for [employer], but has been on disability since age 
39. He lives with his only child, his son Mushtaq and his wife.  He still smokes 
upwards of half pack a day.

 

REVIEW OF SYSTEMS:  No history of seizure, CVA , fainting, hepatitis, alcoholic 
hepatitis.  He had had thrombocytopenia, but in recent years platelet count has 
been over 200.  He has had no intraabdominal surgery.  He has had chronic iron 
deficiency with hemoglobin under 13, except for one value at 13.4 in October 2015 and MCV has generally been under 80 with the October 2015 being at 84 when 
presumably he was relatively iron replete.

 

PHYSICAL EXAMINATION:  He is a chronically ill appearing man with a full black 
beard, lying in bed, comfortable, but very hard of hearing.  He is afebrile.  
Blood pressure 112/55.  HEENT exam shows no icterus.  Mucous membranes are 
little bit dry.  He has no adenopathy.  Breast sounds are diminished 
symmetrically.  He has no rhonchi.  Heart sounds are regular.  The abdomen is 
obese with multiple faded, smudged tattoos.  There is some protuberance, but no 
organomegaly palpated.  There is no scar.  Rectal deferred.  There is a right 
AKA.  The left leg has no edema. Neurologic is nonfocal with symmetric cranial 
nerves and movement of the arms. Ambulation cannot be tested.

 

DIAGNOSTIC STUDIES/LAB DATA:  Hemoglobin 10.4, hematocrit 33, MCV 71.  Sodium 
127, BUN 3, creatinine 0.55.  Iron 29, TIBC 399, ferritin 12.2.  LFTs normal.  
Albumin 4.1 in November 2018.  B12 of 437 on New Year's Day.  TSH 0.82.

 

IMPRESSION:  Debilitated man status post right leg amputation who presents with 
recurring nausea and vomiting.  He has chronic iron deficiency anemia - 
reassessment of control of his gastroesophageal reflux disease is appropriate 
and he appears stable to undergo this.  Repleting his iron would likely have to 
be done intravenously.  This will be arduous given his overall situation and 
his family will have to be engaged.

 

 [id number]/[facility] #: [id number]

[facility]

## 2019-02-23 NOTE — PRO
DATE:  02/23/19 - ROOM #422                                                    
   REFERRING PHYSICIAN:  Randi Nicolas MD *

 

PROCEDURE:  Upper gastrointestinal endoscopy and biopsy of esophagus at 25 cm 
and nodule at EG junction 37 cm.

 

INDICATION:  This 68-year-old man with prior severe erosive GERD in 2013 healed 
up after a year of PPI therapy, was admitted with repetitive nausea and 
vomiting.  It is not certain that he has been compliant with maintenance a.m. 
pantoprazole 40 mg.  He has a chronic iron-deficiency anemia.  Four colon 
polyps were removed in 2012, one a tubulo-villous adenoma.  He has a family 
history of multiple cancers, potentially one in the colon.

 

He has improved overnight.  He has no sign of respiratory insufficiency, fever, 
or cough.

 

ENDOSCOPIST:  Dr. Campbell                     



MEDICATIONS:  Midazolam 2, meperidine (he referenced FENTANYL allergy and it 
was chosen not to challenge that, although it was only itching that could be 
documented).   He tolerated the exam very well.



FINDINGS:  He is a chronically ill appearing man with a full beard, in no 
respiratory distress whatsoever.  He was positioned left side down, and 
moderate sedation induced easily, and he tolerated the exam very well.

 

ESOPHAGOGASTRODUODENOSCOPY:

Larynx - symmetric, limited views.

 

Esophagus - easily entered, the mucosa is normal from about 17 down to 23 and 
then speckled exudate is seen diffusely and increasing through to about 29 when 
the exudate was confluent and circumferential down to the EG junction at 37.  
The esophagitis was severe.  There were no focal ulcers.  Uncertain about 
potential Fine margin.  Biopsies were taken at 25.  Biopsies were also taken 
of an EG junction nodule about 7 o'clock.  There was then a moderate hiatal 
hernia.  There was no fundic prolapse as the patient was not gagging.

 

Stomach - generally normal mucosa seen in the cardia, fundus, body, and antrum.
  No erosions were seen. 

Duodenum - the pylorus, bulb, and second through fourth portions were normal.

 

IMPRESSION:

1.  Moderate hiatal hernia.

2.  Severe erosive gastroesophageal reflux disease over two-thirds of the 
esophagus - this is certainly enough to influence the CT result.

3.  Focal esophagogastric nodule - probably inflammatory in nature and not the 
exclusive cause of the CT result.  Repeat exam in 2 months would be indicated 
on intense treatment, if that can be orchestrated by the family.



[id number]/[facility] #: [id number]

[facility]

## 2019-02-24 LAB
ANION GAP SERPL CALC-SCNC: 9 MMOL/L (ref 2–11)
BASOPHILS # BLD AUTO: 0.1 10^3/UL (ref 0–0.2)
BUN SERPL-MCNC: 3 MG/DL (ref 6–24)
BUN/CREAT SERPL: 5.3 (ref 8–20)
CALCIUM SERPL-MCNC: 8.5 MG/DL (ref 8.6–10.3)
CHLORIDE SERPL-SCNC: 96 MMOL/L (ref 101–111)
EOSINOPHIL # BLD AUTO: 0.1 10^3/UL (ref 0–0.6)
GLUCOSE SERPL-MCNC: 91 MG/DL (ref 70–100)
HCO3 SERPL-SCNC: 24 MMOL/L (ref 22–32)
HCT VFR BLD AUTO: 30 % (ref 42–52)
HGB BLD-MCNC: 9.8 G/DL (ref 14–18)
LYMPHOCYTES # BLD AUTO: 1.3 10^3/UL (ref 1–4.8)
MAGNESIUM SERPL-MCNC: 1.6 MG/DL (ref 1.9–2.7)
MCH RBC QN AUTO: 23 PG (ref 27–31)
MCHC RBC AUTO-ENTMCNC: 33 G/DL (ref 31–36)
MCV RBC AUTO: 70 FL (ref 80–94)
MONOCYTES # BLD AUTO: 1.3 10^3/UL (ref 0–0.8)
NEUTROPHILS # BLD AUTO: 8 10^3/UL (ref 1.5–7.7)
NRBC # BLD AUTO: 0 10^3/UL
NRBC BLD QL AUTO: 0
PLATELET # BLD AUTO: 273 10^3/UL (ref 150–450)
POTASSIUM SERPL-SCNC: 3.4 MMOL/L (ref 3.5–5)
RBC # BLD AUTO: 4.28 10^6/UL (ref 4–5.4)
SODIUM SERPL-SCNC: 129 MMOL/L (ref 135–145)
WBC # BLD AUTO: 10.9 10^3/UL (ref 3.5–10.8)

## 2019-02-24 RX ADMIN — SUCRALFATE SCH GM: 1 SUSPENSION ORAL at 05:36

## 2019-02-24 RX ADMIN — SUCRALFATE SCH: 1 SUSPENSION ORAL at 13:01

## 2019-02-24 RX ADMIN — GUAIFENESIN SCH MG: 600 TABLET, EXTENDED RELEASE ORAL at 22:17

## 2019-02-24 RX ADMIN — GABAPENTIN SCH MG: 300 CAPSULE ORAL at 10:17

## 2019-02-24 RX ADMIN — GUAIFENESIN SCH MG: 600 TABLET, EXTENDED RELEASE ORAL at 10:18

## 2019-02-24 RX ADMIN — POTASSIUM CHLORIDE SCH MLS/HR: 200 INJECTION, SOLUTION INTRAVENOUS at 16:56

## 2019-02-24 RX ADMIN — HYDROCODONE BITARTRATE AND ACETAMINOPHEN PRN TAB: 5; 325 TABLET ORAL at 23:14

## 2019-02-24 RX ADMIN — PANTOPRAZOLE SODIUM SCH: 40 INJECTION, POWDER, FOR SOLUTION INTRAVENOUS at 11:36

## 2019-02-24 RX ADMIN — DULOXETINE HYDROCHLORIDE SCH MG: 30 CAPSULE, DELAYED RELEASE ORAL at 10:19

## 2019-02-24 RX ADMIN — AMOXICILLIN AND CLAVULANATE POTASSIUM SCH MG: 875; 125 TABLET, FILM COATED ORAL at 22:17

## 2019-02-24 RX ADMIN — SUCRALFATE SCH GM: 1 SUSPENSION ORAL at 16:57

## 2019-02-24 RX ADMIN — PANTOPRAZOLE SODIUM SCH MG: 40 TABLET, DELAYED RELEASE ORAL at 22:16

## 2019-02-24 RX ADMIN — GABAPENTIN SCH MG: 300 CAPSULE ORAL at 22:17

## 2019-02-24 RX ADMIN — SUCRALFATE SCH GM: 1 SUSPENSION ORAL at 10:20

## 2019-02-24 RX ADMIN — PANTOPRAZOLE SODIUM SCH MG: 40 TABLET, DELAYED RELEASE ORAL at 13:26

## 2019-02-24 RX ADMIN — SUCRALFATE SCH GM: 1 SUSPENSION ORAL at 22:19

## 2019-02-24 RX ADMIN — GABAPENTIN SCH MG: 300 CAPSULE ORAL at 13:26

## 2019-02-24 RX ADMIN — PIPERACILLIN AND TAZOBACTAM SCH MLS/HR: 3; .375 INJECTION, POWDER, LYOPHILIZED, FOR SOLUTION INTRAVENOUS; PARENTERAL at 01:11

## 2019-02-24 RX ADMIN — PIPERACILLIN AND TAZOBACTAM SCH MLS/HR: 3; .375 INJECTION, POWDER, LYOPHILIZED, FOR SOLUTION INTRAVENOUS; PARENTERAL at 05:36

## 2019-02-24 RX ADMIN — POTASSIUM CHLORIDE SCH MLS/HR: 200 INJECTION, SOLUTION INTRAVENOUS at 23:18

## 2019-02-24 NOTE — PN
Subjective


Date of Service: 02/24/19


Interval History: 





Patient is feeling well. Patient denies abdominal pain, nausea, chest pain, 

shortness of breath, cough, or other pain. Patient is anxious to go home. 

Patient denies F/C, dizziness, or other pain. 


Family History: Unchanged from Admission


Social History: Unchanged from Admission


Past Medical History: Unchanged from Admission





Objective


Active Medications: 








Acetaminophen (Tylenol Tab*)  650 mg PO Q6H PRN


   PRN Reason: FEVER/PAIN


Hydrocodone Bitart/Acetaminophen (Norco 5-325 Tab*)  1 tab PO TID PRN


   PRN Reason: PAIN


   Last Admin: 02/23/19 12:29 Dose:  1 tab


Albuterol/Ipratropium (Duoneb (Albuterol 2.5 Mg/Ipratropium 0.5 Mg))  1 neb INH 

Q4H PRN


   PRN Reason: SOB/WHEEZING


Amoxicillin/Clavulanate Potassium (Augmentin Tab*)  875 mg PO BID [facility]


Benzonatate (Tessalon Cap*)  100 mg PO BID PRN


   PRN Reason: COUGH


Docusate Sodium (Colace Cap*)  100 mg PO DAILY PRN


   PRN Reason: CONSTIPATION


Duloxetine HCl (Cymbalta Cap*)  30 mg PO DAILY [facility]


   Last Admin: 02/24/19 10:19 Dose:  30 mg


Gabapentin (Neurontin Cap(*))  300 mg PO TID [facility]


   Last Admin: 02/24/19 10:17 Dose:  300 mg


Guaifenesin (Mucinex*)  1,200 mg PO BID [facility]


   Last Admin: 02/24/19 10:18 Dose:  1,200 mg


Magnesium Sulfate 3 gm/ Sodium (Chloride)  106 mls @ 53 mls/hr IVPB ONCE ONE


   Stop: 02/24/19 13:29


Potassium Chloride (Potassium Chloride 20 Meq/100 Ml Ivpremix*)  20 meq in 100 

mls @ 50 mls/hr IV Q2H [facility]


   Stop: 02/24/19 17:59


Lisinopril (Prinivil Tab*)  2.5 mg PO DAILY [facility]


Nicotine (Nicotine Inhaler*)  10 mg INH Q2H PRN


   PRN Reason: CRAVING


Nystatin (Nystatin Top Powder*)  1 applic TOPICAL Q8HR PRN


   PRN Reason: ITCHING


Pantoprazole Sodium (Protonix Tab*)  40 mg PO BID [facility]


Senna (Senokot Tab*)  1 tab PO DAILY PRN


   PRN Reason: CONSTIPATION


Sucralfate (Sucralfate Susp)  1 gm PO ACHS [facility]


Trazodone HCl (Desyrel Tab*)  100 mg PO BEDTIME PRN


   PRN Reason: SLEEP


Trimethobenzamide HCl (Tigan Cap*)  300 mg PO Q6H PRN


   PRN Reason: NAUSEA








 Vital Signs - 8 hr











  02/24/19 02/24/19 02/24/19





  07:49 08:00 10:17


 


Temperature 98.0 F  


 


Pulse Rate 77  


 


Respiratory 18 16 18





Rate   


 


Blood Pressure 139/68  





(mmHg)   


 


O2 Sat by Pulse 94  





Oximetry   











Oxygen Devices in Use Now: None


Appearance: Patient is a 67yo male who appears older than stated age and is 

sitting in the bed in NAD.


Eyes: No Scleral Icterus, PERRLA


Ears/Nose/Mouth/Throat: NL Teeth, Lips, Gums, Clear Oropharnyx, Mucous 

Membranes Moist


Neck: NL Appearance and Movements; NL JVP, Trachea Midline


Respiratory: Symmetrical Chest Expansion and Respiratory Effort, Clear to 

Auscultation, - - Diminished. 


Cardiovascular: NL Sounds; No Murmurs; No JVD, RRR, No Edema


Abdominal: NL Sounds; No Tenderness; No Distention, No Hepatosplenomegaly


Lymphatic: No Cervical Adenopathy


Extremities: No Edema, No Clubbing, Cyanosis, - - Right Leg surgically absent 

above knee. 


Skin: No Rash or Ulcers, No Nodules or Sclerosis


Neurological: Alert and Oriented x 3, NL Sensation, NL Muscle Strength and Tone

, - - CN II-XII intact. 


Result Diagrams: 


 02/24/19 07:23





 02/24/19 07:23


Microbiology and Other Data: 


 Microbiology











 02/22/19 19:15 Legionella Urinary Antigen - Final





 Urine    Negative Legionella Antigen





 Streptococcus pneumoniae Ag Screen - Final





    Negative S. pneumo Antigen














Assess/Plan/Problems-Billing


Assessment: 





Patient is a 67yo male with a PMH for GI Bleeding due to erosive esophagitis, 

HFrEF, COPD, Alcoholism, chronic hyponatremia, who presents to the hospital 

with severe nausea and vomiting for 1 day with hematemesis and was found to 

have recurrent severe erosive esophagitis. Patient also has hyponatremia and 

likely aspiriation pneumonia. 





- Patient Problems


(1) Erosive esophagitis


Current Visit: Yes   Status: Acute   Code(s): K22.10 - ULCER OF ESOPHAGUS 

WITHOUT BLEEDING   SNOMED Code(s): 80522109


   Comment: 


- Appreciate GI input, confirmed by EGD, Polyp, but no other signs of malignancy


- Continue Sucralfate and Transition to PO Protonix BID


- Will need close follow up EGD


- Symptoms have subsided at this time. 


- Continue to avoid anticoagulation at this time.    





(2) Anemia


Current Visit: No   Status: Acute   Code(s): D64.9 - ANEMIA, UNSPECIFIED   

SNOMED Code(s): 850051986


   Comment: 


- Chronic microcytic


- Has Iron studies consistent with WALDEMAR


- Cannot supplement orally due to Erosive Esophagitis


- Start IV Iron and will need additional dosing outpatient. 


- Worsening with likely component of dilution and blood loss from esophagus. 


- No Hemodynamic instability.    





(3) CAD (coronary artery disease)


Current Visit: No   Status: Acute   Code(s): I25.10 - ATHSCL HEART DISEASE OF 

NATIVE CORONARY ARTERY W/O ANG PCTRS   SNOMED Code(s): 42524345


   Comment: 


- Asymptomatic.  


- Continue atorvastatin.


- Hold anticoagulants at this time.    





(4) COPD (chronic obstructive pulmonary disease)


Current Visit: No   Status: Acute   Code(s): J44.9 - CHRONIC OBSTRUCTIVE 

PULMONARY DISEASE, UNSPECIFIED   SNOMED Code(s): 08020174


   Comment: 


- No signs of exacerbation


- Cont spiriva and duonebs prn, monitor closely.   





(5) Chronic combined systolic and diastolic CHF (congestive heart failure)


Current Visit: No   Status: Acute   Code(s): I50.42 - CHRONIC COMBINED SYSTOLIC 

AND DIASTOLIC HRT FAIL   SNOMED Code(s): 967530559800453


   Comment: 


- Echo demonstrates EF 40-45%.


- Appears euvolemic   





(6) Factor V Leiden mutation


Current Visit: No   Status: Acute   Code(s): D68.51 - ACTIVATED PROTEIN C 

RESISTANCE   SNOMED Code(s): 224696137


   Comment: 


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


- Anticoagulation with caution if at all.    





(7) Aspiration pneumonia


Current Visit: Yes   Status: Acute   Code(s): J69.0 - PNEUMONITIS DUE TO 

INHALATION OF FOOD AND VOMIT   SNOMED Code(s): 601341370


   Comment: 


- Due to extreme vomiting


- Treated with Zosyn, Transition to Augmentin


- With associated sepsis which has now resolved


- No Current Respiratory symptoms.    





(8) Hyponatremia


Current Visit: No   Status: Acute   Code(s): E87.1 - HYPO-OSMOLALITY AND 

HYPONATREMIA   SNOMED Code(s): 30010195


   Comment: 


- Chronic but below baseline on admission


- Likely combination of solute deficiency and Transient SIADH with severe 

vomiting


- Stop fluids, rising at reasonable rate.    





(9) Full code status


Current Visit: No   Status: Acute   Code(s): Z78.9 - OTHER SPECIFIED HEALTH 

STATUS   SNOMED Code(s): 147932101


   


Status and Disposition: 





Inpatient for GI bleed. Hopeful discharge tomorrow.

## 2019-02-25 LAB
ALBUMIN SERPL BCG-MCNC: 3.7 G/DL (ref 3.2–5.2)
ALBUMIN/GLOB SERPL: 1.1 {RATIO} (ref 1–3)
ALP SERPL-CCNC: 74 U/L (ref 34–104)
ALT SERPL W P-5'-P-CCNC: 4 U/L (ref 7–52)
ANION GAP SERPL CALC-SCNC: 6 MMOL/L (ref 2–11)
AST SERPL-CCNC: 11 U/L (ref 13–39)
BASOPHILS # BLD AUTO: 0.1 10^3/UL (ref 0–0.2)
BUN SERPL-MCNC: 2 MG/DL (ref 6–24)
BUN/CREAT SERPL: 3.8 (ref 8–20)
CALCIUM SERPL-MCNC: 8.9 MG/DL (ref 8.6–10.3)
CHLORIDE SERPL-SCNC: 93 MMOL/L (ref 101–111)
EOSINOPHIL # BLD AUTO: 0.1 10^3/UL (ref 0–0.6)
GLOBULIN SER CALC-MCNC: 3.3 G/DL (ref 2–4)
GLUCOSE SERPL-MCNC: 96 MG/DL (ref 70–100)
HCO3 SERPL-SCNC: 26 MMOL/L (ref 22–32)
HCT VFR BLD AUTO: 32 % (ref 42–52)
HGB BLD-MCNC: 10.1 G/DL (ref 14–18)
LYMPHOCYTES # BLD AUTO: 1.2 10^3/UL (ref 1–4.8)
MAGNESIUM SERPL-MCNC: 1.6 MG/DL (ref 1.9–2.7)
MCH RBC QN AUTO: 23 PG (ref 27–31)
MCHC RBC AUTO-ENTMCNC: 32 G/DL (ref 31–36)
MCV RBC AUTO: 71 FL (ref 80–94)
MONOCYTES # BLD AUTO: 1.2 10^3/UL (ref 0–0.8)
NEUTROPHILS # BLD AUTO: 6.8 10^3/UL (ref 1.5–7.7)
NRBC # BLD AUTO: 0 10^3/UL
NRBC BLD QL AUTO: 0
PLATELET # BLD AUTO: 294 10^3/UL (ref 150–450)
POTASSIUM SERPL-SCNC: 4 MMOL/L (ref 3.5–5)
PROT SERPL-MCNC: 7 G/DL (ref 6.4–8.9)
RBC # BLD AUTO: 4.46 10^6/UL (ref 4–5.4)
SODIUM SERPL-SCNC: 125 MMOL/L (ref 135–145)
WBC # BLD AUTO: 9.4 10^3/UL (ref 3.5–10.8)

## 2019-02-25 RX ADMIN — GUAIFENESIN SCH MG: 600 TABLET, EXTENDED RELEASE ORAL at 21:39

## 2019-02-25 RX ADMIN — POTASSIUM CHLORIDE SCH MLS/HR: 200 INJECTION, SOLUTION INTRAVENOUS at 00:07

## 2019-02-25 RX ADMIN — POTASSIUM CHLORIDE SCH MLS/HR: 200 INJECTION, SOLUTION INTRAVENOUS at 02:05

## 2019-02-25 RX ADMIN — AMOXICILLIN AND CLAVULANATE POTASSIUM SCH MG: 875; 125 TABLET, FILM COATED ORAL at 09:27

## 2019-02-25 RX ADMIN — SUCRALFATE SCH: 1 SUSPENSION ORAL at 12:34

## 2019-02-25 RX ADMIN — LISINOPRIL SCH MG: 5 TABLET ORAL at 09:27

## 2019-02-25 RX ADMIN — GABAPENTIN SCH MG: 300 CAPSULE ORAL at 09:27

## 2019-02-25 RX ADMIN — SUCRALFATE SCH GM: 1 SUSPENSION ORAL at 09:26

## 2019-02-25 RX ADMIN — PANTOPRAZOLE SODIUM SCH MG: 40 TABLET, DELAYED RELEASE ORAL at 09:27

## 2019-02-25 RX ADMIN — DULOXETINE HYDROCHLORIDE SCH MG: 30 CAPSULE, DELAYED RELEASE ORAL at 09:26

## 2019-02-25 RX ADMIN — GABAPENTIN SCH MG: 300 CAPSULE ORAL at 15:56

## 2019-02-25 RX ADMIN — AMOXICILLIN AND CLAVULANATE POTASSIUM SCH MG: 875; 125 TABLET, FILM COATED ORAL at 21:39

## 2019-02-25 RX ADMIN — SUCRALFATE SCH: 1 SUSPENSION ORAL at 22:03

## 2019-02-25 RX ADMIN — HYDROCODONE BITARTRATE AND ACETAMINOPHEN PRN TAB: 5; 325 TABLET ORAL at 21:39

## 2019-02-25 RX ADMIN — GABAPENTIN SCH MG: 300 CAPSULE ORAL at 21:39

## 2019-02-25 RX ADMIN — TRAZODONE HYDROCHLORIDE PRN MG: 50 TABLET ORAL at 21:39

## 2019-02-25 RX ADMIN — HYDROCODONE BITARTRATE AND ACETAMINOPHEN PRN TAB: 5; 325 TABLET ORAL at 09:42

## 2019-02-25 RX ADMIN — PANTOPRAZOLE SODIUM SCH MG: 40 TABLET, DELAYED RELEASE ORAL at 21:39

## 2019-02-25 RX ADMIN — SUCRALFATE SCH: 1 SUSPENSION ORAL at 17:58

## 2019-02-25 RX ADMIN — GUAIFENESIN SCH MG: 600 TABLET, EXTENDED RELEASE ORAL at 09:26

## 2019-02-25 NOTE — PN
Subjective


Date of Service: 02/25/19


Interval History: 





Patient is feeling well today. Patient denies N/V, abdominal pain, diarrhea, CP

, SOB, diarrhea, melena, F/C. Patient was amenable for discharge this AM, but 

upon arrival of Son and daughter in law, patient became agitated and vomited. 

Son expressed serious concerns about his ability to care for his father at 

home. Son states there is no food or heat in the house. Son states that he 

feels that he cannot take care of his father any more and thinks he would need 

placement. 


Family History: Unchanged from Admission


Social History: Unchanged from Admission


Past Medical History: Unchanged from Admission





Objective


Active Medications: 








Acetaminophen (Tylenol Tab*)  650 mg PO Q6H PRN


   PRN Reason: FEVER/PAIN


Hydrocodone Bitart/Acetaminophen (Norco 5-325 Tab*)  1 tab PO TID PRN


   PRN Reason: PAIN


   Last Admin: 02/25/19 09:42 Dose:  1 tab


Albuterol/Ipratropium (Duoneb (Albuterol 2.5 Mg/Ipratropium 0.5 Mg))  1 neb INH 

Q4H PRN


   PRN Reason: SOB/WHEEZING


Amoxicillin/Clavulanate Potassium (Augmentin Tab*)  875 mg PO BID [facility]


   Last Admin: 02/25/19 09:27 Dose:  875 mg


Benzonatate (Tessalon Cap*)  100 mg PO BID PRN


   PRN Reason: COUGH


Docusate Sodium (Colace Cap*)  100 mg PO DAILY PRN


   PRN Reason: CONSTIPATION


Duloxetine HCl (Cymbalta Cap*)  30 mg PO DAILY [facility]


   Last Admin: 02/25/19 09:26 Dose:  30 mg


Gabapentin (Neurontin Cap(*))  300 mg PO TID [facility]


   Last Admin: 02/25/19 15:56 Dose:  300 mg


Guaifenesin (Mucinex*)  1,200 mg PO BID [facility]


   Last Admin: 02/25/19 09:26 Dose:  1,200 mg


Lisinopril (Prinivil Tab*)  2.5 mg PO DAILY [facility]


   Last Admin: 02/25/19 09:27 Dose:  2.5 mg


Nicotine (Nicotine Inhaler*)  10 mg INH Q2H PRN


   PRN Reason: CRAVING


Nystatin (Nystatin Top Powder*)  1 applic TOPICAL Q8HR PRN


   PRN Reason: ITCHING


Pantoprazole Sodium (Protonix Tab*)  40 mg PO BID [facility]


   Last Admin: 02/25/19 09:27 Dose:  40 mg


Senna (Senokot Tab*)  1 tab PO DAILY PRN


   PRN Reason: CONSTIPATION


Sucralfate (Sucralfate Susp)  1 gm PO ACHS [facility]


   Last Admin: 02/25/19 12:34 Dose:  Not Given


Trazodone HCl (Desyrel Tab*)  100 mg PO BEDTIME PRN


   PRN Reason: SLEEP


Trimethobenzamide HCl (Tigan Cap*)  300 mg PO Q6H PRN


   PRN Reason: NAUSEA








 Vital Signs - 8 hr











  02/25/19 02/25/19 02/25/19





  09:27 09:42 12:09


 


Temperature   98.1 F


 


Pulse Rate   71


 


Respiratory 16 20 18





Rate   


 


Blood Pressure   141/67





(mmHg)   


 


O2 Sat by Pulse   95





Oximetry   














  02/25/19 02/25/19 02/25/19





  12:28 14:28 15:56


 


Temperature   


 


Pulse Rate   


 


Respiratory 20 18 18





Rate   


 


Blood Pressure   





(mmHg)   


 


O2 Sat by Pulse   





Oximetry   











Oxygen Devices in Use Now: None


Appearance: Patient is a 69yo male who appears older than stated age and is 

sitting in the bed in NAD.


Eyes: No Scleral Icterus, PERRLA


Ears/Nose/Mouth/Throat: NL Teeth, Lips, Gums, Clear Oropharnyx, Mucous 

Membranes Moist


Neck: NL Appearance and Movements; NL JVP, Trachea Midline


Respiratory: Symmetrical Chest Expansion and Respiratory Effort, - - Diminished 

throughout. 


Cardiovascular: NL Sounds; No Murmurs; No JVD, RRR, No Edema


Abdominal: NL Sounds; No Tenderness; No Distention, No Hepatosplenomegaly


Lymphatic: No Cervical Adenopathy


Extremities: No Edema, No Clubbing, Cyanosis, - - Right lower extremity 

surgically absent above the knee. 


Skin: No Rash or Ulcers, No Nodules or Sclerosis


Neurological: NL Sensation, NL Muscle Strength and Tone, - - Alert to Self and 

place. Confused generally. CN II-XII intact. 


Result Diagrams: 


 02/25/19 06:49





 02/25/19 06:49


Microbiology and Other Data: 


 Microbiology











 02/22/19 19:15 Legionella Urinary Antigen - Final





 Urine    Negative Legionella Antigen





 Streptococcus pneumoniae Ag Screen - Final





    Negative S. pneumo Antigen














Assess/Plan/Problems-Billing


Assessment: 





Patient is a 69yo male with a PMH for GI Bleeding due to erosive esophagitis, 

HFrEF, COPD, Alcoholism, chronic hyponatremia, who presents to the hospital 

with severe nausea and vomiting for 1 day with hematemesis and was found to 

have recurrent severe erosive esophagitis. Patient also has hyponatremia and 

likely aspiriation pneumonia. 





- Patient Problems


(1) Erosive esophagitis


Current Visit: Yes   Status: Acute   Code(s): K22.10 - ULCER OF ESOPHAGUS 

WITHOUT BLEEDING   SNOMED Code(s): 98210214


   Comment: 


- Appreciate GI input, confirmed by EGD, Polyp, but no other signs of malignancy


- Continue Sucralfate and PO Protonix BID


- Will need close follow up EGD


- Symptoms have subsided at this time. 


- Continue to avoid anticoagulation at this time.    





(2) Anemia


Current Visit: No   Status: Acute   Code(s): D64.9 - ANEMIA, UNSPECIFIED   

SNOMED Code(s): 925790440


   Comment: 


- Chronic microcytic


- Has Iron studies consistent with WALDEMAR


- Cannot supplement orally due to Erosive Esophagitis


- Start IV Iron and will need additional dosing outpatient. 


- Give doses as long as patient is in the hospital. 


- Polyps on previous colonoscopy, Outpatient GI provider will decide on timing 

of repeat colonoscopy if indicated. 


- Worsening with likely component of dilution and blood loss from esophagus. 


- No Hemodynamic instability.    





(3) CAD (coronary artery disease)


Current Visit: No   Status: Acute   Code(s): I25.10 - ATHSCL HEART DISEASE OF 

NATIVE CORONARY ARTERY W/O ANG PCTRS   SNOMED Code(s): 19704569


   Comment: 


- Asymptomatic.  


- Continue atorvastatin


- Hold anticoagulants at this time.    





(4) COPD (chronic obstructive pulmonary disease)


Current Visit: No   Status: Acute   Code(s): J44.9 - CHRONIC OBSTRUCTIVE 

PULMONARY DISEASE, UNSPECIFIED   SNOMED Code(s): 43138308


   Comment: 


- No signs of exacerbation


- Cont spiriva and duonebs prn, monitor closely.   





(5) Chronic combined systolic and diastolic CHF (congestive heart failure)


Current Visit: No   Status: Acute   Code(s): I50.42 - CHRONIC COMBINED SYSTOLIC 

AND DIASTOLIC HRT FAIL   SNOMED Code(s): 703980080561953


   Comment: 


- Echo demonstrates EF 40-45%.


- Appears euvolemic


- Continue Lisinopril   





(6) Factor V Leiden mutation


Current Visit: No   Status: Acute   Code(s): D68.51 - ACTIVATED PROTEIN C 

RESISTANCE   SNOMED Code(s): 625165924


   Comment: 


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


- Anticoagulation with caution if at all.    





(7) Aspiration pneumonia


Current Visit: Yes   Status: Acute   Code(s): J69.0 - PNEUMONITIS DUE TO 

INHALATION OF FOOD AND VOMIT   SNOMED Code(s): 350352857


   Comment: 


- Due to extreme vomiting


- Transition to Augmentin


- With associated sepsis which has now resolved


- No Current Respiratory symptoms.    





(8) Hyponatremia


Current Visit: No   Status: Acute   Code(s): E87.1 - HYPO-OSMOLALITY AND 

HYPONATREMIA   SNOMED Code(s): 72444032


   Comment: 


- Chronic but below baseline on admission


- Likely combination of solute deficiency and Transient SIADH with severe 

vomiting


- Now at baseline   





(9) Full code status


Current Visit: No   Status: Acute   Code(s): Z78.9 - OTHER SPECIFIED HEALTH 

STATUS   SNOMED Code(s): 479425578


   


Status and Disposition: 





Inpatient for GI bleed. Patient was scheduled for discharge today, but son has 

significant concerns about his ability to care for patient at home. Will 

attempt to get placement at SNF.

## 2019-02-26 RX ADMIN — SUCRALFATE SCH GM: 1 SUSPENSION ORAL at 08:43

## 2019-02-26 RX ADMIN — NICOTINE PRN MG: 4 INHALANT RESPIRATORY (INHALATION) at 13:06

## 2019-02-26 RX ADMIN — GABAPENTIN SCH MG: 300 CAPSULE ORAL at 22:31

## 2019-02-26 RX ADMIN — SUCRALFATE SCH GM: 1 SUSPENSION ORAL at 13:04

## 2019-02-26 RX ADMIN — SUCRALFATE SCH GM: 1 SUSPENSION ORAL at 17:33

## 2019-02-26 RX ADMIN — PANTOPRAZOLE SODIUM SCH MG: 40 TABLET, DELAYED RELEASE ORAL at 08:44

## 2019-02-26 RX ADMIN — AMOXICILLIN AND CLAVULANATE POTASSIUM SCH MG: 875; 125 TABLET, FILM COATED ORAL at 22:32

## 2019-02-26 RX ADMIN — GABAPENTIN SCH MG: 300 CAPSULE ORAL at 12:56

## 2019-02-26 RX ADMIN — GABAPENTIN SCH MG: 300 CAPSULE ORAL at 08:44

## 2019-02-26 RX ADMIN — HYDROCODONE BITARTRATE AND ACETAMINOPHEN PRN TAB: 5; 325 TABLET ORAL at 12:56

## 2019-02-26 RX ADMIN — GUAIFENESIN SCH MG: 600 TABLET, EXTENDED RELEASE ORAL at 22:31

## 2019-02-26 RX ADMIN — LISINOPRIL SCH MG: 5 TABLET ORAL at 08:44

## 2019-02-26 RX ADMIN — AMOXICILLIN AND CLAVULANATE POTASSIUM SCH MG: 875; 125 TABLET, FILM COATED ORAL at 08:44

## 2019-02-26 RX ADMIN — SUCRALFATE SCH GM: 1 SUSPENSION ORAL at 22:32

## 2019-02-26 RX ADMIN — PANTOPRAZOLE SODIUM SCH MG: 40 TABLET, DELAYED RELEASE ORAL at 22:31

## 2019-02-26 RX ADMIN — DULOXETINE HYDROCHLORIDE SCH MG: 30 CAPSULE, DELAYED RELEASE ORAL at 08:44

## 2019-02-26 RX ADMIN — TRAZODONE HYDROCHLORIDE PRN MG: 50 TABLET ORAL at 22:38

## 2019-02-26 RX ADMIN — GUAIFENESIN SCH MG: 600 TABLET, EXTENDED RELEASE ORAL at 08:44

## 2019-02-26 RX ADMIN — NICOTINE PRN MG: 4 INHALANT RESPIRATORY (INHALATION) at 22:41

## 2019-02-26 NOTE — PN
Subjective


Date of Service: 02/26/19


Interval History: 





Patient seen and examined. No events today. Per RN, patient was combative 

overnight and required haldol, security at bedside for behavior. Today, patient 

appears calm with no recollection of events. Denies SOB, no chest pain, no 

fevers or chills. Extremely [place].


Family History: Unchanged from Admission


Social History: Unchanged from Admission


Past Medical History: Unchanged from Admission





Objective


Active Medications: 








Acetaminophen (Tylenol Tab*)  650 mg PO Q6H PRN


   PRN Reason: FEVER/PAIN


Hydrocodone Bitart/Acetaminophen (Norco 5-325 Tab*)  1 tab PO TID PRN


   PRN Reason: PAIN


   Last Admin: 02/26/19 12:56 Dose:  1 tab


Albuterol/Ipratropium (Duoneb (Albuterol 2.5 Mg/Ipratropium 0.5 Mg))  1 neb INH 

Q4H PRN


   PRN Reason: SOB/WHEEZING


Amoxicillin/Clavulanate Potassium (Augmentin Tab*)  875 mg PO BID [facility]


   Last Admin: 02/26/19 08:44 Dose:  875 mg


Benzonatate (Tessalon Cap*)  100 mg PO BID PRN


   PRN Reason: COUGH


Docusate Sodium (Colace Cap*)  100 mg PO DAILY PRN


   PRN Reason: CONSTIPATION


Duloxetine HCl (Cymbalta Cap*)  30 mg PO DAILY [facility]


   Last Admin: 02/26/19 08:44 Dose:  30 mg


Gabapentin (Neurontin Cap(*))  300 mg PO TID [facility]


   Last Admin: 02/26/19 12:56 Dose:  300 mg


Guaifenesin (Mucinex*)  1,200 mg PO BID [facility]


   Last Admin: 02/26/19 08:44 Dose:  1,200 mg


Haloperidol Lactate (Haldol Inj Iv/Im*)  2.5 mg IV SLOW PU Q6H PRN


   PRN Reason: AGITATION


Lisinopril (Prinivil Tab*)  2.5 mg PO DAILY [facility]


   Last Admin: 02/26/19 08:44 Dose:  2.5 mg


Lorazepam (Ativan Inj*)  1 mg IV PUSH Q4H PRN


   PRN Reason: ANXIETY


Nicotine (Nicotine Inhaler*)  10 mg INH Q2H PRN


   PRN Reason: CRAVING


   Last Admin: 02/26/19 13:06 Dose:  10 mg


Nystatin (Nystatin Top Powder*)  1 applic TOPICAL Q8HR PRN


   PRN Reason: ITCHING


Pantoprazole Sodium (Protonix Tab*)  40 mg PO BID [facility]


   Last Admin: 02/26/19 08:44 Dose:  40 mg


Senna (Senokot Tab*)  1 tab PO DAILY PRN


   PRN Reason: CONSTIPATION


Sucralfate (Sucralfate Susp)  1 gm PO ACHS [facility]


   Last Admin: 02/26/19 13:04 Dose:  1 gm


Trazodone HCl (Desyrel Tab*)  100 mg PO BEDTIME PRN


   PRN Reason: SLEEP


   Last Admin: 02/25/19 21:39 Dose:  100 mg


Trimethobenzamide HCl (Tigan Cap*)  300 mg PO Q6H PRN


   PRN Reason: NAUSEA








 Vital Signs - 8 hr











  02/26/19 02/26/19 02/26/19





  07:54 08:44 10:03


 


Temperature 98.5 F  


 


Pulse Rate 88  


 


Respiratory 18 18 18





Rate   


 


Blood Pressure 122/63  





(mmHg)   


 


O2 Sat by Pulse 92  





Oximetry   














  02/26/19 02/26/19 02/26/19





  11:27 11:36 12:56


 


Temperature  98.4 F 


 


Pulse Rate  79 


 


Respiratory 18 18 16





Rate   


 


Blood Pressure  134/66 





(mmHg)   


 


O2 Sat by Pulse  95 





Oximetry   











Oxygen Devices in Use Now: None


Appearance: alert, NAD


Eyes: No Scleral Icterus, PERRLA


Ears/Nose/Mouth/Throat: - - edentulous and extremely hard of hearing


Neck: NL Appearance and Movements; NL JVP, Trachea Midline


Respiratory: Symmetrical Chest Expansion and Respiratory Effort, Clear to 

Auscultation


Cardiovascular: NL Sounds; No Murmurs; No JVD, No Edema


Abdominal: NL Sounds; No Tenderness; No Distention


Extremities: No Edema, No Clubbing, Cyanosis


Skin: No Rash or Ulcers


Neurological: - - A&Ox2


Nutrition: Taking PO's


Result Diagrams: 


 02/25/19 06:49





 02/25/19 06:49


Microbiology and Other Data: 


 Microbiology











 02/22/19 19:15 Legionella Urinary Antigen - Final





 Urine    Negative Legionella Antigen





 Streptococcus pneumoniae Ag Screen - Final





    Negative S. pneumo Antigen














Assess/Plan/Problems-Billing


Assessment: 





Patient is a 69yo male with a PMH for GI Bleeding due to erosive esophagitis, 

HFrEF, COPD, Alcoholism, chronic hyponatremia, who presents to the hospital 

with severe nausea and vomiting for 1 day with hematemesis and was found to 

have recurrent severe erosive esophagitis. Patient also has hyponatremia and 

likely aspiriation pneumonia. 





- Patient Problems


(1) Aspiration pneumonia


Code(s): J69.0 - PNEUMONITIS DUE TO INHALATION OF FOOD AND VOMIT   SNOMED Code(s

): 556809739


   Comment: 


 - Resolving, 2/2 extreme vomiting


 - Transition to Augmentin


 - With associated sepsis which has now resolved


   





(2) Erosive esophagitis


Code(s): K22.10 - ULCER OF ESOPHAGUS WITHOUT BLEEDING   SNOMED Code(s): 62130589


   Comment: 


 - Appreciate GI input, confirmed by EGD, Polyp, but no other signs of 

malignancy


 - Continue Sucralfate and PO Protonix BID


 - Will need close follow up EGD


 - Continue to avoid anticoagulation at this time.    





(3) Hyponatremia


Code(s): E87.1 - HYPO-OSMOLALITY AND HYPONATREMIA   SNOMED Code(s): 41239391


   Comment: 


 - Chronic but below baseline on admission


 - Likely combination of solute deficiency and Transient SIADH with severe 

vomiting


 - Now at baseline   





(4) Alcoholism


Code(s): F10.20 - ALCOHOL DEPENDENCE, UNCOMPLICATED   SNOMED Code(s): 1027019


   Comment: 


 - does not appear to be in withdrawal   





(5) Anemia


Code(s): D64.9 - ANEMIA, UNSPECIFIED   SNOMED Code(s): 442524469


   Comment: 


 - Chronic microcytic


 - Has Iron studies consistent with WALDEMAR


 - Cannot supplement orally due to Erosive Esophagitis


 - Start IV Iron and will need additional dosing outpatient


- Give doses as long as patient is in the hospital. 


- Polyps on previous colonoscopy, Outpatient GI provider will decide on timing 

of repeat colonoscopy if indicated. 


- Worsening with likely component of dilution and blood loss from esophagus. 


- No Hemodynamic instability.    





(6) DVT prophylaxis


Code(s): EBR4217 -    SNOMED Code(s): 369928636


   Comment: 


 - SCDs, high risk for bleeding   





(7) Full code status


Code(s): Z78.9 - OTHER SPECIFIED HEALTH STATUS   SNOMED Code(s): 510224626


   


Status and Disposition: 





Medically stable for DC to [facility] when bed available.

## 2019-02-26 NOTE — CONSULT
Consult


Consult: 





Consult for Medical Decision Making Capacity





S: Psychiatry is asked to evaluate capacity in this 68 y.o. single, white male 

with a complicated medical history including heart disease and alcoholism, 

admitted to the Hospitalist service on February 22nd due to hematemesis.  He 

has been medically treated and stabilized, and was pending discharge back to 

his son's trailer in [place], when his son, who is his HCP, indicated to the 

treatment team that he can no longer care for the patient do to having extreme 

financial limitations.  Apparently there is no heat or food in the trailer.  

The family is requesting SNF placement.  The primary team attempted to speak 

with Mr. Bowden about a nursing home but he is adamantly opposed to it.





On exam, Mr. Bowden is sitting in a wide chair next to the door so he can 

watch the nurse's station.  He is calm and cooperative.  He is told about my 

understanding of his situation but he denies it, saying "Oh no, my son told me 

he can't wait for me to come home."  He is aware of no psychosocial issues in 

the household; states there are eggs and toast for breakfast and his son and 

daughter-in-law cook lunch and dinner for him.  On the subject of SNF placement 

he bitterly protests this, saying that he proudly took care of both his aging 

parents prior to their deaths in their own home.  He is unable to identify any 

risks associated with refusing SNF placement and refuses to consider that his 

family lacks the means to care for him.





O: aging white male with a large, dishevelled beard, dark skinned; dressed in a 

patient gown; overweight with limited grooming, cooperative; euthymic mood with 

a full affect; denies SI or HI;  insight and judgment poor given insistence on 

returning to his son's; awake and alert; knows date and month but not day of 

week, year or season.  Fully oriented to place with intact immediate recall, 

poor attention and significant deficits in delayed recall.





A/P: Capacity: During our interaction, Mr. Bowden failed to demonstrate a 

reasonable understanding of his illness, the recommended treatment or the 

associated risks of refusing said treatment.  In my judgment, he lacks the 

capacity to make an informed decision about accepting or declining SNF 

placement.  Capacity is subject to change in these situations and psychiatry 

can be re-consulted in the event of any significant changes in his presentation/

situation. Thank you for the consult..

## 2019-02-27 VITALS — SYSTOLIC BLOOD PRESSURE: 156 MMHG | DIASTOLIC BLOOD PRESSURE: 65 MMHG

## 2019-02-27 RX ADMIN — AMOXICILLIN AND CLAVULANATE POTASSIUM SCH MG: 875; 125 TABLET, FILM COATED ORAL at 08:58

## 2019-02-27 RX ADMIN — GUAIFENESIN SCH MG: 600 TABLET, EXTENDED RELEASE ORAL at 08:59

## 2019-02-27 RX ADMIN — PANTOPRAZOLE SODIUM SCH MG: 40 TABLET, DELAYED RELEASE ORAL at 08:58

## 2019-02-27 RX ADMIN — GABAPENTIN SCH MG: 300 CAPSULE ORAL at 13:16

## 2019-02-27 RX ADMIN — DULOXETINE HYDROCHLORIDE SCH MG: 30 CAPSULE, DELAYED RELEASE ORAL at 08:59

## 2019-02-27 RX ADMIN — SUCRALFATE SCH GM: 1 SUSPENSION ORAL at 13:10

## 2019-02-27 RX ADMIN — GABAPENTIN SCH MG: 300 CAPSULE ORAL at 08:57

## 2019-02-27 RX ADMIN — NICOTINE PRN MG: 4 INHALANT RESPIRATORY (INHALATION) at 06:34

## 2019-02-27 RX ADMIN — LISINOPRIL SCH MG: 5 TABLET ORAL at 08:56

## 2019-02-27 RX ADMIN — SUCRALFATE SCH GM: 1 SUSPENSION ORAL at 08:55

## 2019-02-27 NOTE — DS
*** AMENDED REPORT NOW INCLUDES COSIGNER DESIGNATION ***



CC:  Dr. Randi Nicolas; Dr. Reid Turk, Psychiatry; Dr. Campbell, GI *

 

DATE OF ADMISSION:  02/22/2019.

 

DATE OF DISCHARGE:  02/27/2019.

 

PRIMARY CARE PHYSICIAN:  Dr. Randi Nicolas.

 

MY ATTENDING PHYSICIAN FOR TODAY:  Dr. Shira Levine * (dictated by Clementine Carcamo, NP).

 

HOSPITAL COURSE:  Please refer to admission history and physical dated 02/22/
2019. In short, this is a 68-year-old male patient with a past medical history 
of alcohol abuse, MI, GI bleeding secondary to sever erosive esophagitis and 
chronic hyponatremia who presented to the emergency department with complaint 
of vomiting with coffee ground emesis and some blood clots.  The patient also 
stated that he had some epigastric pain.  The patient again does have history 
of erosive esophagitis and alcohol abuse.  He was admitted for upper GI bleeding
, hyponatremia, and also was found to have some aspiration pneumonia secondary 
to extreme vomiting and was meeting criteria for sepsis.  

 

He underwent endoscopy with Dr. Campbell on the 23rd of February.  Report of his 
endoscopy showed a moderate hiatal hernia, severe erosive GERD with two-thirds 
of the esophagus, focal esophagogastric nodule, probably inflammatory in 
nature.  It is also significant to note that the initial CAT scan showed that 
there was a possibly a mass in the esophagus which was proved not to be present 
when the patient had his endoscopic procedure.  The patient was placed on 
Carafate and PPI by GI.

 

He did have laboratory work-up for his hyponatremia and also his low iron.  He 
received the start of iron infusion on 

02/23/2019.  He will need this monthly. His sodium did begin to correct.  He is 
chronically low.  He came in with a sodium of 122 and is usually around 127-
128.  At one point he was 129, but his normal sodium is usually in the high 
120s.  His urinalysis did not show any acute infective process.  Toxicology 
screen and serum alcohol was less than 10.  His hemoglobin remained stable for 
the duration of his stay.

 

The patient did have one episode of combative behavior which required security 
to be at the bedside and the patient did seem to be a bit disoriented and had 
no recollection of the events the following day.  For this reason, Dr. Ehmke 
was consulted for capacity.  He determined the patient does not have capacity 
for medical decision making or to refuse going to a short-term rehab 
environment.  At this point it was questionable whether the patient would be 
able to go home.  There are some social situations with his family and living 
circumstances.  Given his current state, it was recommended that he go to short-
term rehab.

 

This is a 68-year-old man with a past medical history for GI bleeding who 
presented with the same.  He has had recurrent severe erosive esophagitis.  He 
may also have some underlying dementia that perhaps would be vascular in nature 
given his extensive history of alcoholism.  Also, some of his bizarre behavior 
while he was here could be attributed to aspiration pneumonia and his GI 
bleeding.  His mood has been calm.  He is very hard of hearing; however, he 
does not complain of any nausea or vomiting, no abdominal pain, no chest pain, 
no shortness of breath, no fevers or chills, and no further constitutional 
complaints. 10 point review of systems is otherwise negative.

 

PHYSICAL EXAMINATION:  On the day of discharge reveals a moderately disheveled 
gentleman looking slightly older than his stated age.  Vital Signs:  Blood 
pressure 156/65, heart rate 80, respiratory rate 16, O2 saturation 97 percent 
on room air with a temperature of 97.7. HEENT:  The patient is atraumatic, 
normocephalic. PERRLA with nonicteric sclerae.  Oral mucosa is moist.  He is 
edentulous.  Tongue is midline.  Neck:  Supple, nontender, no JVD noted, no 
carotid bruit auscultated. Cardiovascular:  S1, S2 present.  Rate and rhythm 
are regular.  No murmurs, gallops or rubs.  Lungs: Clear at the apices, 
diminished at the bases bilaterally with no wheezing, rhonchi or rales.  Abdomen
:  Soft, nontender, nondistended.  Positive bowel sounds all four quadrants.  
:  Deferred.  Musculoskeletal:  There is no clubbing and no cyanosis.  He has 
no pedal edema.  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 
is above the knee.  Stump is intact with no discoloration noted to skin.  
Neurologic:  Today he is alert and oriented times three.  He does not present 
with any focal deficits.  I think he does have difficulty understanding the 
results of his healthcare needs and his need for rehab, but he is otherwise 
appropriate today.

 

LABORATORY DATA:  WBC 9.4, RBC 4.46, hemoglobin 10.1, hematocrit 32, platelets 
294; sodium 125, potassium 4.0, chloride 93, CO2 26, BUN 2, creatinine 0.52, 
.6, glucose 96, calcium 8.9, iron 29, TIBC 399, percent saturation 7, 
unsaturated iron binding is less than 384, transferrin 285, ferritin 12.2, 
bilirubin 0.40, ALT 11, AST 4, alk phos 74, albumin 3.7, globulin 3.3.

 

IMAGING STUDIES:

1.  The patient had a chest x-ray on the 22nd which showed patchy right lower 
lung consolidation, recommend follow-up to resolution.

2.  CT of the abdomen and pelvis shows consider chronic or recurrent diffuse 
severe esophagitis with inflammatory changes, cholelithiasis, normal appendix, 
negative for ascites, negative for lymphadenopathy, negative for obstructive 
uropathy.

 

DISCHARGE DIAGNOSES:

1.  Nausea and vomiting secondary to chronic severe erosive esophagitis, now 
stable.

2.  Iron deficiency anemia.

3.  Coronary artery disease, stable.

4.  COPD, stable.

5.  History of combined systolic and diastolic congestive heart failure, stable.

6.  Factor V Leiden mutation, stable.

7.  Aspiration pneumonia secondary to vomiting, now stable, with concurrent 
sepsis, now resolved.

8.  Hyponatremia, acute on chronic, currently stable.

 

DISCHARGE MEDICATIONS:

1.  Norco 5/325 one tab p.o. t.i.d. as needed.

2.  DuoNeb four times a day as needed.

3.  Duloxetine 30 mg daily.

4.  Lisinopril 2.5 mg daily.

5.  Gabapentin 300 mg three times a day.

6.  Nystatin topical as needed.

7.  Trazodone 100 mg at bedtime as needed.

8.  Ra P-Col one tablet p.o. daily as needed.

9.  Mucinex 1200 mg p.o. b.i.d.

10. Carafate 1 gm p.o. a.c. at bedtime.

11. Protonix 40 mg p.o. b.i.d.

12. Nicotine inhaler 10 mg q.2 hours as needed.

13. Tessalon 100 mg p.o. b.i.d.

14. Augmentin 875 mg p.o. b.i.d.

15. Tylenol 650 mg q.6 hours as needed.

 

DISPOSITION:  The patient was discharged to [facility].

 

DIET:  He should have a heart-healthy to regular diet as tolerated.

 

ACTIVITY:  Progress as tolerated.

 

FOLLOW-UP:  The patient is going to short-term rehab; however, he should follow-
up with his primary care provider, Dr. Nicolas, within one to two weeks after 
discharge and to continue iron infusions as an outpatient. He should also see 
Dr. Campbell within one month for a follow-up EGD.

 

CONDITION ON DISCHARGE:  The patient was discharged in stable condition.  Case 
Management discussed the discharge plan of care with the patient's son who is 
in agreement with the patient going to [facility].

 

TIME SPENT:  Approximately 40 minutes interfacing with the patient, staff, and 
determining discharge plan of care.

 

____________________________________ CLEMENTINE CARCAMO NP

 

[id number]/CPS #: [id number]

RUBIO

## 2019-06-11 ENCOUNTER — HOSPITAL ENCOUNTER (INPATIENT)
Dept: HOSPITAL 25 - ED | Age: 69
LOS: 3 days | Discharge: HOME HEALTH | DRG: 641 | End: 2019-06-14
Attending: HOSPITALIST | Admitting: NURSE PRACTITIONER
Payer: MEDICARE

## 2019-06-11 DIAGNOSIS — G89.29: ICD-10-CM

## 2019-06-11 DIAGNOSIS — M19.90: ICD-10-CM

## 2019-06-11 DIAGNOSIS — E87.1: Primary | ICD-10-CM

## 2019-06-11 DIAGNOSIS — Z96.641: ICD-10-CM

## 2019-06-11 DIAGNOSIS — Z98.41: ICD-10-CM

## 2019-06-11 DIAGNOSIS — I73.9: ICD-10-CM

## 2019-06-11 DIAGNOSIS — I25.10: ICD-10-CM

## 2019-06-11 DIAGNOSIS — D68.51: ICD-10-CM

## 2019-06-11 DIAGNOSIS — K21.9: ICD-10-CM

## 2019-06-11 DIAGNOSIS — Z98.42: ICD-10-CM

## 2019-06-11 DIAGNOSIS — E46: ICD-10-CM

## 2019-06-11 DIAGNOSIS — K22.10: ICD-10-CM

## 2019-06-11 DIAGNOSIS — E87.2: ICD-10-CM

## 2019-06-11 DIAGNOSIS — I25.2: ICD-10-CM

## 2019-06-11 DIAGNOSIS — Z66: ICD-10-CM

## 2019-06-11 DIAGNOSIS — R56.9: ICD-10-CM
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
## 2019-06-11 DIAGNOSIS — M81.0: ICD-10-CM

## 2019-06-11 DIAGNOSIS — Z86.718: ICD-10-CM

## 2019-06-11 DIAGNOSIS — Z89.611: ICD-10-CM

## 2019-06-11 DIAGNOSIS — J44.9: ICD-10-CM

## 2019-06-11 DIAGNOSIS — F32.9: ICD-10-CM

## 2019-06-11 DIAGNOSIS — H91.8X3: ICD-10-CM

## 2019-06-11 DIAGNOSIS — G93.40: ICD-10-CM

## 2019-06-11 DIAGNOSIS — G54.6: ICD-10-CM

## 2019-06-11 DIAGNOSIS — F10.20: ICD-10-CM

## 2019-06-11 DIAGNOSIS — F17.210: ICD-10-CM

## 2019-06-11 DIAGNOSIS — I50.9: ICD-10-CM

## 2019-06-11 DIAGNOSIS — Z88.6: ICD-10-CM

## 2019-06-11 DIAGNOSIS — Z82.49: ICD-10-CM

## 2019-06-11 LAB
ALBUMIN SERPL BCG-MCNC: 4.3 G/DL (ref 3.2–5.2)
ALBUMIN/GLOB SERPL: 1.2 {RATIO} (ref 1–3)
ALP SERPL-CCNC: 91 U/L (ref 34–104)
ALT SERPL W P-5'-P-CCNC: 7 U/L (ref 7–52)
ANION GAP SERPL CALC-SCNC: 10 MMOL/L (ref 2–11)
APTT PPP: 39.4 SECONDS (ref 26–38)
AST SERPL-CCNC: 17 U/L (ref 13–39)
BASOPHILS # BLD AUTO: 0.1 10^3/UL (ref 0–0.2)
BUN SERPL-MCNC: 6 MG/DL (ref 6–24)
BUN/CREAT SERPL: 9.2 (ref 8–20)
CALCIUM SERPL-MCNC: 9.4 MG/DL (ref 8.6–10.3)
CHLORIDE SERPL-SCNC: 81 MMOL/L (ref 101–111)
CHLORIDE UR-SCNC: 27 MMOL/L
EOSINOPHIL # BLD AUTO: 0 10^3/UL (ref 0–0.6)
GLOBULIN SER CALC-MCNC: 3.7 G/DL (ref 2–4)
GLUCOSE SERPL-MCNC: 150 MG/DL (ref 70–100)
HCO3 SERPL-SCNC: 26 MMOL/L (ref 22–32)
HCT VFR BLD AUTO: 40 % (ref 42–52)
HGB BLD-MCNC: 13.3 G/DL (ref 14–18)
INR PPP/BLD: 0.98 (ref 0.82–1.09)
LYMPHOCYTES # BLD AUTO: 1.1 10^3/UL (ref 1–4.8)
MCH RBC QN AUTO: 25 PG (ref 27–31)
MCHC RBC AUTO-ENTMCNC: 33 G/DL (ref 31–36)
MCV RBC AUTO: 75 FL (ref 80–94)
MONOCYTES # BLD AUTO: 1.6 10^3/UL (ref 0–0.8)
NEUTROPHILS # BLD AUTO: 12.4 10^3/UL (ref 1.5–7.7)
NRBC # BLD AUTO: 0 10^3/UL
NRBC BLD QL AUTO: 0
PLATELET # BLD AUTO: 297 10^3/UL (ref 150–450)
POTASSIUM SERPL-SCNC: 3.7 MMOL/L (ref 3.5–5)
POTASSIUM UR-SCNC: 21.4 MMOL/L
PROT SERPL-MCNC: 8 G/DL (ref 6.4–8.9)
RBC # BLD AUTO: 5.32 10^6 /UL (ref 4.18–5.48)
RBC UR QL AUTO: (no result)
SODIUM SERPL-SCNC: 117 MMOL/L (ref 135–145)
SODIUM UR-SCNC: 28 MMOL/L
TROPONIN I SERPL-MCNC: 0.02 NG/ML (ref ?–0.04)
TSH SERPL-ACNC: 3.72 MCIU/ML (ref 0.34–5.6)
WBC # BLD AUTO: 15.1 10^3/UL (ref 3.5–10.8)
WBC UR QL AUTO: (no result)

## 2019-06-11 PROCEDURE — 84443 ASSAY THYROID STIM HORMONE: CPT

## 2019-06-11 PROCEDURE — 71045 X-RAY EXAM CHEST 1 VIEW: CPT

## 2019-06-11 PROCEDURE — 83930 ASSAY OF BLOOD OSMOLALITY: CPT

## 2019-06-11 PROCEDURE — 80320 DRUG SCREEN QUANTALCOHOLS: CPT

## 2019-06-11 PROCEDURE — 81015 MICROSCOPIC EXAM OF URINE: CPT

## 2019-06-11 PROCEDURE — 36415 COLL VENOUS BLD VENIPUNCTURE: CPT

## 2019-06-11 PROCEDURE — 83605 ASSAY OF LACTIC ACID: CPT

## 2019-06-11 PROCEDURE — 80307 DRUG TEST PRSMV CHEM ANLYZR: CPT

## 2019-06-11 PROCEDURE — 87086 URINE CULTURE/COLONY COUNT: CPT

## 2019-06-11 PROCEDURE — 82436 ASSAY OF URINE CHLORIDE: CPT

## 2019-06-11 PROCEDURE — 94640 AIRWAY INHALATION TREATMENT: CPT

## 2019-06-11 PROCEDURE — 70450 CT HEAD/BRAIN W/O DYE: CPT

## 2019-06-11 PROCEDURE — 84300 ASSAY OF URINE SODIUM: CPT

## 2019-06-11 PROCEDURE — 99285 EMERGENCY DEPT VISIT HI MDM: CPT

## 2019-06-11 PROCEDURE — 93005 ELECTROCARDIOGRAM TRACING: CPT

## 2019-06-11 PROCEDURE — 85025 COMPLETE CBC W/AUTO DIFF WBC: CPT

## 2019-06-11 PROCEDURE — 87040 BLOOD CULTURE FOR BACTERIA: CPT

## 2019-06-11 PROCEDURE — 87641 MR-STAPH DNA AMP PROBE: CPT

## 2019-06-11 PROCEDURE — 83935 ASSAY OF URINE OSMOLALITY: CPT

## 2019-06-11 PROCEDURE — 80048 BASIC METABOLIC PNL TOTAL CA: CPT

## 2019-06-11 PROCEDURE — 84133 ASSAY OF URINE POTASSIUM: CPT

## 2019-06-11 PROCEDURE — 85730 THROMBOPLASTIN TIME PARTIAL: CPT

## 2019-06-11 PROCEDURE — 84550 ASSAY OF BLOOD/URIC ACID: CPT

## 2019-06-11 PROCEDURE — 82803 BLOOD GASES ANY COMBINATION: CPT

## 2019-06-11 PROCEDURE — 83880 ASSAY OF NATRIURETIC PEPTIDE: CPT

## 2019-06-11 PROCEDURE — 86140 C-REACTIVE PROTEIN: CPT

## 2019-06-11 PROCEDURE — G0480 DRUG TEST DEF 1-7 CLASSES: HCPCS

## 2019-06-11 PROCEDURE — 85610 PROTHROMBIN TIME: CPT

## 2019-06-11 PROCEDURE — 80053 COMPREHEN METABOLIC PANEL: CPT

## 2019-06-11 PROCEDURE — 82088 ASSAY OF ALDOSTERONE: CPT

## 2019-06-11 PROCEDURE — 81003 URINALYSIS AUTO W/O SCOPE: CPT

## 2019-06-11 PROCEDURE — 84484 ASSAY OF TROPONIN QUANT: CPT

## 2019-06-11 PROCEDURE — 84244 ASSAY OF RENIN: CPT

## 2019-06-11 PROCEDURE — 95816 EEG AWAKE AND DROWSY: CPT

## 2019-06-11 PROCEDURE — 82533 TOTAL CORTISOL: CPT

## 2019-06-11 RX ADMIN — CEFTRIAXONE SODIUM SCH MLS/HR: 1 INJECTION, POWDER, FOR SOLUTION INTRAVENOUS at 23:55

## 2019-06-11 NOTE — ED
Complex/Multi-Sys Presentation





- HPI Summary


HPI Summary: 


This patient is a 68 year old M brought in by [facility] ambulance to [facility] with a 

chief complaint of one seizure since 1700 today. The patient rates the pain 3/

10 in severity. Symptoms aggravated by nothing. Symptoms alleviated by nothing. 

Patient has a right leg amputation. Son reports headache. Per daughter-in-law, 

she and patient were playing cards today before patient started shaking and 

clenching his mouth, making strange noises, leaning over for 7 minutes. Patient 

fell out of his chair and on to the floor per daughter-in-law and was 

unresponsive for about 20 minutes. Daughter-in-law called EMS. Upon arrival, 

EMS noted that patient lived in poor living conditions. EMS found patient on 

the floor and with a purple face. EMS states that patient was mumbling when EMS 

arrived. Patient had 100 BPM and stable blood pressure en route. Per daughter-in

-law, patient was transferred from [facility] a few months ago to 

[facility]. Patient was at [facility] for the last 2-3 months because he was not 

able to care for himself per son. Patient left [facility] yesterday AMA per son. 

Per son, patient had 1 seizure 7 years ago while admitted to hospital for an 

infection. Since then, patient has been confused at baseline per son. Per son, 

patient has not had alcohol for 2 years but patient does smoke. Per son, 

patient had right leg amputated after surgery 2-3 years ago. Son denies Fhx 

seizure. Per son, patient does not use O2 at home.





- History Of Current Complaint


Chief Complaint: EDSeizure


Time Seen by Provider: 06/11/19 20:05


Hx Obtained From: Patient, Family/Caretaker - Son and daughter-in-law, EMS


Hx From Patient Unobtainable Due To: Altered Mental Status


Onset/Duration: Lasting Hours - 1900 today, Still Present


Timing: Constant


Severity Currently: Mild


Aggravating Factor(s): Nothing


Alleviating Factor(s): Nothing


Associated Signs And Symptoms: Positive: Other - headache per son





- Allergies/Home Medications


Allergies/Adverse Reactions: 


 Allergies











Allergy/AdvReac Type Severity Reaction Status Date / Time


 


fentanyl Allergy  Itching Verified 01/01/19 15:59














PMH/Surg Hx/FS Hx/Imm Hx


Previously Healthy: No


Endocrine/Hematology History: Reports: Hx Blood Disorders - factor 5 clotting 

disorder


   Denies: Hx Anticoagulant Therapy, Hx Diabetes, Hx Thyroid Disease


Cardiovascular History: Reports: Hx Angina, Hx Coronary Artery Disease, Hx Deep 

Vein Thrombosis, Hx Myocardial Infarction, Hx Peripheral Vascular Disease, 

Other Cardiovascular Problems/Disorders - fACTOR V LEIDEN CLOTTING DISORDER


   Denies: Hx Cardiomegaly, Hx Congestive Heart Failure, Hx Hypertension, Hx 

Pacemaker/ICD, Hx Rheumatic Fever, Hx Valvular Heart Disease


Respiratory History: Reports: Hx Asthma, Hx Chronic Obstructive Pulmonary 

Disease (COPD), Other Respiratory Problems/Disorders - Longterm smoker


   Denies: Hx Pulmonary Edema, Hx Pulmonary Embolism


GI History: Reports: Hx Gastroesophageal Reflux Disease, Other GI Disorders - 

HX OF GI BLEED - NO PROBLEMS NOW


   Denies: Hx Cirrhosis, Hx Crohn's Disease, Hx Hiatal Hernia, Hx Irritable 

Bowel, Hx Jaundice, Hx Ulcer


 History: 


   Denies: Hx Renal Disease, Other  Problems/Disorders


Musculoskeletal History: Reports: Hx Arthritis, Hx Back Problems, Hx 

Osteoporosis, Other Musculoskeletal History - Fx hip sp fall at home hx


   Denies: Hx Rheumatoid Arthritis, Hx Bursitis


Sensory History: Reports: Hx Cataracts - HAVING CATARACT SURGERY, Hx Contacts 

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


   Denies: Hx Hearing Aid


Opthamlomology History: Reports: Hx Cataracts - HAVING CATARACT SURGERY, Hx 

Contacts or Glasses - for reading


Neurological History: 


   Denies: Hx Headaches, Hx Migraine, Hx Seizures


   Comment Only: Other Neuro Impairments/Disorders - NEUROPATHY/HX SUBSTANCE 

ABUSE


Psychiatric History: Reports: Hx Depression, Hx Substance Abuse


   Denies: Hx Anxiety, Hx Panic Disorder





- Cancer History


Hx Chemotherapy: No





- Surgical History


Surgery Procedure, Year, and Place: femur rodding @ [facility] [place] in april 2013; RTHR 

April 2014; [place] [facility] [place]  [place], 

HERNIA REPAIR


Hx Anesthesia Reactions: No





- Immunization History


Date of Tetanus Vaccine: 2011


Date of Influenza Vaccine: None


Infectious Disease History: Unable to Obtain/Confirm


Infectious Disease History: Reports: Hx of Known/Suspected MRSA


   Denies: Hx Hepatitis, Traveled Outside the [place] in Last 30 Days





- Family History


Known Family History: Positive: Cardiac Disease, Other - CANCER





- Social History


Alcohol Use: Daily


Alcohol Amount: NOT SINCE NEW YEARS


Hx Substance Use: No


Substance Use Type: Reports: None


Hx Tobacco Use: Yes


Smoking Status (MU): Heavy Every Day Tobacco Smoker


Type: Cigarettes


Amount Used/How Often: 1 - 2 PPD


Length of Time of Smoking/Using Tobacco: 50 YEARS


Have You Smoked in the Last Year: Yes





Review of Systems


Negative: Fever


Neurological: Other - AMS, seizure


Positive: Headache


All Other Systems Reviewed And Are Negative: Yes





Physical Exam





- Summary


Physical Exam Summary: 


NORMAL PHYSICAL EXAM


VITAL SIGNS: Reviewed.


GENERAL:  Patient is a well-developed and nourished MALE who is lying 

comfortable in the stretcher. Patient is not in any acute respiratory distress. 

Patient is unkept. Patient is wrenching. 


HEAD AND FACE: No signs of trauma. No ecchymosis, hematomas or skull 

depressions. No sinus tenderness.


EYES: PERRLA, EOMI x 2, No injected conjunctiva, no nystagmus.


EARS: Hearing grossly intact. Ear canals and tympanic membranes are within 

normal limits.


MOUTH: Oropharynx within normal limits.


NECK: Supple, trachea is midline, no adenopathy, no JVD, no carotid bruit, no c-

spine tenderness, neck with full ROM


CHEST: Symmetric, no tenderness at palpation


LUNGS: Decreased breath sounds bilaterally. No wheezing or crackles.


CVS: Regular rate and rhythm, S1 and S2 present, no murmurs or gallops 

appreciated.


ABDOMEN: Soft, non-tender. No signs of distention. No rebound no guarding, and 

no masses palpated. Bowel sounds are normal.


EXTREMITIES: FROM in all major joints, no edema, no cyanosis or clubbing. Right 

total extremity amputation below the right hip.


NEURO: Only oriented to name


SKIN: Dry and warm


Triage Information Reviewed: Yes


Vital Signs On Initial Exam: 


 Initial Vitals











Temp Pulse Resp BP Pulse Ox


 


 97.8 F   96   18   126/76   93 


 


 06/11/19 20:05  06/11/19 20:05  06/11/19 20:05  06/11/19 20:05  06/11/19 20:05











Vital Signs Reviewed: Yes





Diagnostics





- Vital Signs


 Vital Signs











  Temp Pulse Resp BP Pulse Ox


 


 06/11/19 20:05  97.8 F  96  18  126/76  93














- Laboratory


Result Diagrams: 


 06/12/19 05:40





 06/12/19 01:00


Lab Statement: Any lab studies that have been ordered have been reviewed, and 

results considered in the medical decision making process.





- Radiology


  ** Chest X-ray


Summary of Radiographic Findings: Hyperinflation, COPD, no acute infilitrate. 

Pending official report.





- EKG


  ** 2032


Cardiac Rate: NL - 77 BPM


EKG Rhythm: Sinus Rhythm


Summary of EKG Findings: sinus rhythm, 77 BPM, non specific intraventricular 

conduction delay, non-specifc T wave changes





Complex Multi-Symp Course/Dx


Course Of Treatment: This patient is a 68 year old M brought in by [facility] 

ambulance to [facility] with a chief complaint of one seizure since 1700 today. The 

patient rates the pain 3/10 in severity. Symptoms aggravated by nothing. 

Symptoms alleviated by nothing. Patient has a right leg amputation. Son reports 

headache. Per daughter-in-law, she and patient were playing cards today before 

patient started shaking and clenching his mouth, making strange noises, leaning 

over for 7 minutes. Patient fell out of his chair and on to the floor per 

daughter-in-law and was unresponsive for about 20 minutes. Daughter-in-law 

called EMS. Upon arrival, EMS noted that patient lived in poor living 

conditions. EMS found patient on the floor and with a purple face.  Physical 

Exam shows the patient is unkept, wrenching, has decreased breath sounds 

bilaterally, and has right total extremity amputation below the right hip.  Lab 

results show sodium 117 and lactic acid 2.2.  Chest X-ray impression:  

Hyperinflation, COPD, no acute infilitrate.  EKG results:  sinus rhythm, 77 BPM

, non specific intraventricular conduction delay, non-specifc T wave changes.  

During ED course, the patient was given Albuterol, Heparin sodium, 

methylprednisolone sodium succinate, IV fluids, Ondansetron.  At 2122, Dr. Ng, nephrologist, recommends giving hypertonic saline to patient. 

Patient was given hypertonic saline in ED. At 2126, Dr. Aguilar, hospitalist, 

agrees to admit patient. Patient will be admitted and is agreeable.





- Diagnoses


Provider Diagnoses: 


 Seizure, Hyponatremia








- Physician Notifications


Discussed Care Of Patient With: Ada Haines


Time Discussed With Above Provider: 21:22


Instructed by Provider To: Other - Dr Ng, nephrologist, recommends 

giving hypertonic saline to patient. At 2126, Dr. Aguilar, hospitalist, agrees to 

admit patient.





Discharge





- Sign-Out/Discharge


Documenting (check all that apply): Patient Departure - Admit


Patient Received Moderate/Deep Sedation with Procedure: No





- Discharge Plan


Condition: Stable


Disposition: ADMITTED TO [facility]





- Billing Disposition and Condition


Condition: STABLE


Disposition: Admitted to [facility]





- Attestation Statements


Document Initiated by Zunildaibe: Yes


Documenting Scribe: Cesia Arriola


Provider For Whom Lucianoe is Documenting (Include Credential): Rocío Henderson MD


Scribe Attestation: 


Cesia KATE, scribed for Rocío Henderson MD on 06/12/19 at 0607. 


Scribe Documentation Reviewed: Yes


Provider Attestation: 


The documentation as recorded by the [email or web address] accurately reflects 

the service I personally performed and the decisions made by me, Rocío Henderson MD


Status of Scribe Document: Viewed

## 2019-06-12 LAB
ALBUMIN SERPL BCG-MCNC: 3.9 G/DL (ref 3.2–5.2)
ALBUMIN/GLOB SERPL: 1.2 {RATIO} (ref 1–3)
ALP SERPL-CCNC: 79 U/L (ref 34–104)
ALT SERPL W P-5'-P-CCNC: 6 U/L (ref 7–52)
ANION GAP SERPL CALC-SCNC: 7 MMOL/L (ref 2–11)
ANION GAP SERPL CALC-SCNC: 7 MMOL/L (ref 2–11)
ANION GAP SERPL CALC-SCNC: 9 MMOL/L (ref 2–11)
AST SERPL-CCNC: 19 U/L (ref 13–39)
BASOPHILS # BLD AUTO: 0 10^3/UL (ref 0–0.2)
BUN SERPL-MCNC: 5 MG/DL (ref 6–24)
BUN SERPL-MCNC: 5 MG/DL (ref 6–24)
BUN SERPL-MCNC: 7 MG/DL (ref 6–24)
BUN/CREAT SERPL: 12.3 (ref 8–20)
BUN/CREAT SERPL: 8.5 (ref 8–20)
BUN/CREAT SERPL: 8.9 (ref 8–20)
CALCIUM SERPL-MCNC: 8.5 MG/DL (ref 8.6–10.3)
CALCIUM SERPL-MCNC: 8.8 MG/DL (ref 8.6–10.3)
CALCIUM SERPL-MCNC: 9.4 MG/DL (ref 8.6–10.3)
CHLORIDE SERPL-SCNC: 92 MMOL/L (ref 101–111)
CHLORIDE SERPL-SCNC: 93 MMOL/L (ref 101–111)
CHLORIDE SERPL-SCNC: 95 MMOL/L (ref 101–111)
EOSINOPHIL # BLD AUTO: 0 10^3/UL (ref 0–0.6)
GLOBULIN SER CALC-MCNC: 3.3 G/DL (ref 2–4)
GLUCOSE SERPL-MCNC: 139 MG/DL (ref 70–100)
GLUCOSE SERPL-MCNC: 150 MG/DL (ref 70–100)
GLUCOSE SERPL-MCNC: 167 MG/DL (ref 70–100)
HCO3 SERPL-SCNC: 24 MMOL/L (ref 22–32)
HCO3 SERPL-SCNC: 24 MMOL/L (ref 22–32)
HCO3 SERPL-SCNC: 27 MMOL/L (ref 22–32)
HCT VFR BLD AUTO: 40 % (ref 42–52)
HGB BLD-MCNC: 13.1 G/DL (ref 14–18)
LYMPHOCYTES # BLD AUTO: 0.7 10^3/UL (ref 1–4.8)
MCH RBC QN AUTO: 25 PG (ref 27–31)
MCHC RBC AUTO-ENTMCNC: 33 G/DL (ref 31–36)
MCV RBC AUTO: 76 FL (ref 80–94)
MONOCYTES # BLD AUTO: 0.1 10^3/UL (ref 0–0.8)
NEUTROPHILS # BLD AUTO: 7.7 10^3/UL (ref 1.5–7.7)
NRBC # BLD AUTO: 0 10^3/UL
NRBC BLD QL AUTO: 0.1
PLATELET # BLD AUTO: 276 10^3/UL (ref 150–450)
POTASSIUM SERPL-SCNC: 4.4 MMOL/L (ref 3.5–5)
POTASSIUM SERPL-SCNC: 4.6 MMOL/L (ref 3.5–5)
POTASSIUM SERPL-SCNC: 4.7 MMOL/L (ref 3.5–5)
PROT SERPL-MCNC: 7.2 G/DL (ref 6.4–8.9)
RBC # BLD AUTO: 5.26 10^6 /UL (ref 4.18–5.48)
SODIUM SERPL-SCNC: 125 MMOL/L (ref 135–145)
SODIUM SERPL-SCNC: 126 MMOL/L (ref 135–145)
SODIUM SERPL-SCNC: 127 MMOL/L (ref 135–145)
URATE SERPL-MCNC: 6.3 MG/DL (ref 4.4–7.6)
WBC # BLD AUTO: 8.5 10^3/UL (ref 3.5–10.8)

## 2019-06-12 PROCEDURE — 4A00X4Z MEASUREMENT OF CENTRAL NERVOUS ELECTRICAL ACTIVITY, EXTERNAL APPROACH: ICD-10-PCS

## 2019-06-12 RX ADMIN — ACETAMINOPHEN PRN MG: 325 TABLET ORAL at 20:35

## 2019-06-12 RX ADMIN — MORPHINE SULFATE PRN MG: 4 INJECTION INTRAVENOUS at 01:42

## 2019-06-12 RX ADMIN — ACETAMINOPHEN PRN MG: 325 TABLET ORAL at 00:16

## 2019-06-12 RX ADMIN — MORPHINE SULFATE PRN MG: 4 INJECTION INTRAVENOUS at 02:34

## 2019-06-12 RX ADMIN — GABAPENTIN SCH MG: 300 CAPSULE ORAL at 08:59

## 2019-06-12 RX ADMIN — MORPHINE SULFATE PRN MG: 4 INJECTION INTRAVENOUS at 08:59

## 2019-06-12 RX ADMIN — GABAPENTIN SCH MG: 300 CAPSULE ORAL at 20:35

## 2019-06-12 RX ADMIN — CEFTRIAXONE SODIUM SCH MLS/HR: 1 INJECTION, POWDER, FOR SOLUTION INTRAVENOUS at 23:32

## 2019-06-12 RX ADMIN — PANTOPRAZOLE SODIUM SCH MG: 40 TABLET, DELAYED RELEASE ORAL at 08:59

## 2019-06-12 NOTE — PN
Subjective


Date of Service: 06/12/19


Interval History: 





Patient has difficulty communicating, he is [place] and confused. He wants antonio 

removed.  


He has been dislodging IV, telemetry lines. 


Denies pain.


I had discussion with son Mushtaq earlier this afternoon, he states father's 

wishes are to be DNR, DNI. He agrees he is suffering and this could end 

peacefully.


Family History: Unchanged from Admission


Social History: Unchanged from Admission


Past Medical History: Unchanged from Admission





Objective


Active Medications: 








Acetaminophen (Tylenol Tab*)  650 mg PO Q6H PRN


   PRN Reason: FEVER


   Last Admin: 06/12/19 00:16 Dose:  650 mg


Albuterol (Ventolin 2.5 Mg/3 Ml Neb.Sol*)  2.5 mg INH .Q20M [facility]


Albuterol/Ipratropium (Duoneb (Albuterol 2.5 Mg/Ipratropium 0.5 Mg))  1 neb INH 

RT.M2MM-HMRBL AWAKE PRN


   PRN Reason: SOB/WHEEZING


Gabapentin (Neurontin Cap(*))  600 mg PO BID [facility]


   Last Admin: 06/12/19 08:59 Dose:  600 mg


Ceftriaxone Sodium 1 gm/ (Sodium Chloride)  50 mls @ 200 mls/hr IVPB Q24H [facility]


   Last Admin: 06/11/19 23:55 Dose:  200 mls/hr


Morphine Sulfate (Morphine 4 Mg/Ml Vial (1 Ml))  4 mg IV Q4H PRN


   PRN Reason: PAIN


   Last Admin: 06/12/19 08:59 Dose:  4 mg


Ondansetron HCl (Zofran Inj*)  4 mg IV Q6H PRN


   PRN Reason: NAUSEA


Pantoprazole Sodium (Protonix Tab*)  40 mg PO DAILY [facility]


   Last Admin: 06/12/19 08:59 Dose:  40 mg








 Vital Signs - 8 hr











  06/12/19 06/12/19 06/12/19





  08:59 09:00 10:00


 


Temperature   


 


Pulse Rate  93 85


 


Respiratory 19 15 14





Rate   


 


Blood Pressure  151/86 122/69





(mmHg)   


 


O2 Sat by Pulse  96 93





Oximetry   














  06/12/19 06/12/19 06/12/19





  11:00 11:38 12:00


 


Temperature  36.9 C 


 


Pulse Rate 85  82


 


Respiratory 14  24





Rate   


 


Blood Pressure 132/82  129/70





(mmHg)   


 


O2 Sat by Pulse 92  88





Oximetry   














  06/12/19 06/12/19





  13:00 14:00


 


Temperature  


 


Pulse Rate 77 89


 


Respiratory 14 13





Rate  


 


Blood Pressure 124/78 





(mmHg)  


 


O2 Sat by Pulse 93 91





Oximetry  











Oxygen Devices in Use Now: Nasal Cannula


Appearance: awake, trying to answer questions, good eye contact, coughing


Eyes: No Scleral Icterus


Ears/Nose/Mouth/Throat: - - poor dentition


Neck: NL Appearance and Movements; NL JVP


Respiratory: Symmetrical Chest Expansion and Respiratory Effort, Clear to 

Auscultation


Cardiovascular: NL Sounds; No Murmurs; No JVD, No Edema


Abdominal: NL Sounds; No Tenderness; No Distention, No Hepatosplenomegaly


Lymphatic: No Cervical Adenopathy


Extremities: - - AK amputee RLE


Skin: No Rash or Ulcers


Lines/Tubes/Other Access: Clean, Dry and Intact Peripheral IV


Nutrition: Taking PO's


Result Diagrams: 


 06/12/19 05:40





 06/12/19 11:16


Additional Lab and Data: 





 Laboratory Tests











  06/11/19 06/11/19 06/12/19





  20:35 20:53 01:00


 


ABG pH   7.42 


 


ABG pCO2   40 


 


ABG pO2   93 


 


ABG HCO3   25.8 


 


ABG O2 Saturation   99.0 H 


 


ABG Base Excess   1.3 


 


Glucose   


 


Lactic Acid  2.2 H*   0.9


 


Cortisol   














  06/12/19 06/12/19





  05:40 11:16


 


ABG pH  


 


ABG pCO2  


 


ABG pO2  


 


ABG HCO3  


 


ABG O2 Saturation  


 


ABG Base Excess  


 


Glucose  150 H  139 H


 


Lactic Acid  


 


Cortisol  6.03 











Microbiology and Other Data: 


 Microbiology











 06/11/19 23:55 Nasal Screen MRSA (PCR) - Final





 Nasal    Mrsa Not Detected














Assess/Plan/Problems-Billing


Assessment: 


68 year old man with history of COPD, alcohol abuse, here with severe 

hyponatremia, seizure.








- Patient Problems


(1) Hyponatremia


Current Visit: Yes   Status: Acute   Priority: High   Code(s): E87.1 - HYPO-

OSMOLALITY AND HYPONATREMIA   SNOMED Code(s): 79958027


   Comment: - Acute on chronic


- Patient corrected nicely with hypertonic saline, now on fluid restriction


- no need to further correct today, will liberalize fluid


- Discussed case with Dr. Haines   





(2) COPD (chronic obstructive pulmonary disease)


Current Visit: Yes   Status: Acute   Priority: Medium   Code(s): J44.9 - 

CHRONIC OBSTRUCTIVE PULMONARY DISEASE, UNSPECIFIED   SNOMED Code(s): 99652946


   Comment: 


- Only signs of exacerbation would be cough and fever overnight


- Restart spiriva and duonebs prn, continue ceftriaxone   





(3) Seizures


Current Visit: Yes   Status: Acute   Priority: Medium   Code(s): R56.9 - 

UNSPECIFIED CONVULSIONS   SNOMED Code(s): 70664301


   Comment: -Seizures very likely due to hyponatremia


-EEG normal, neurology input appreciated.   





(4) Erosive esophagitis


Current Visit: No   Status: Acute   Priority: Medium   Code(s): K22.10 - ULCER 

OF ESOPHAGUS WITHOUT BLEEDING   SNOMED Code(s): 73675248


   Comment: 


 - reviewed past GI consult


 - Continue PO Protonix QD


 - Continue to avoid anticoagulation at this time.    





(5) DVT prophylaxis


Current Visit: No   Status: Acute   Priority: Low   Code(s): ZZK1913 -    

SNOMED Code(s): 864900563


   Comment: 


 - SCDs, high risk for bleeding   


Status and Disposition: 





inpatient, can go to medical floor.

## 2019-06-12 NOTE — EEG
ELECTROENCEPHALOGRAPHY:

 

DATE OF STUDY:  19

 

DATE READ:  19

 

ORDERED BY:  Nesha Carcamo NP.

 

CLINICAL PROBLEM:  Mr. Bowden is a 68-year-old man with a chronic history 
of hyponatremia, who presented with seizure-like activity.  This EEG was 
requested to evaluate for epileptiform abnormalities or electrographic seizures.

 

MEDICATIONS:

1.  Albuterol.

2.  Ceftriaxone.

3.  Morphine.

4.  Ondansetron.

5.  Pantoprazole.

 

TIMING OF RECORDIN to .

 

CLINICAL STATE:  Awake and drowsy.

 

REPORT:  The background lacked organization with clearly defined anterior-
posterior voltage and frequency gradients.  There was poorly sustained slow 
posterior dominant rhythm of approximately 7.5 Hz.  Instead, the background 
consisted of diffuse medium amplitude polymorphic 3-7 Hz delta and theta range 
slowing.  There were paroxysms of diffuse, higher amplitude, 2-4 Hz delta 
slowing lasting 30-90 seconds seen throughout the recording.  There was no 
evolution or propagation of the frequency.  It seems like this more prominent 
delta slowing was seen predominantly during drowsy state.  Hyperventilation and 
photic stimulation were not performed.  Single electrode EKG showed a normal 
sinus rhythm with a rate of 80 beats per minute.  Throughout the recording, 
there were no epileptiform discharges or electrographic seizures.

 

CLINICAL IMPRESSION:  This is an abnormal EEG due to the presence of diffuse 
reactive slowing and poorly sustained posterior dominant rhythm. These findings 
are suggestive of a nonspecific mild-moderate diffuse encephalopathy, which can 
be seen in the setting of toxic metabolic disturbance, hypoxic encephalopathy, 
postictal state, or medication effect.  There were no epileptiform discharges 
or electrographic seizures.

 

 

 

[id number] #: [id number]

[facility]IC[facility]

## 2019-06-12 NOTE — HP
ADMISSION HISTORY AND PHYSICAL:

 

DATE OF ADMISSION:  06/11/19

 

ATTENDING PROVIDER:  Dr. Michelle Aguilar * (DICTATED BY CLEMENTINE CHRISTINE NP)



PRIMARY CARE PROVIDER:  The patient's primary care provider was Dr. Randi Nicolas.  The patient has most recently been a resident of [facility].

 

 AND HEALTHCARE PROXY:  The patient's son, Mushtaq Bowden, 
phone number is [phone].

 

CHIEF COMPLAINT:  Seizure.

 

HISTORY OF PRESENT ILLNESS:  Mr. Bowden is a 68-year-old male patient that 
was brought to the emergency department by EMS services when he was found by 
his son to have a generalized seizure while residing at his son's house.  Per 
the son's report, the patient was residing at [facility] for 
the last several months after being discharged from the hospital this past 
February.  It is unclear why the patient was released from [facility].  The son explained that the patient did not have capacity; however, 
[facility] allowed the patient to sign out against medical 
advice.  The notes from the patient's hospitalization in February of this year 
from his last discharge indicated that Dr. Turk, Psychiatry, did note that the 
patient did not have capacity at the time of discharge, which was why he was 
sent to rehabilitation facility.  It is unclear whether the patient's capacity 
changed or not.  The patient's son does not feel that the patient did have 
capacity either; however, the events reported that [facility] allowed the 
patient to sign out against medical advice and they told the patient's son to 
come and pick him up.  The patient's son did pick him up because he was 
concerned for his father's safety and he brought him to his home, where he 
subsequently had a seizure today and then was brought to the emergency 
department in a postictal state.  Upon arrival in the emergency department, he 
was found to have profound hyponatremia with a sodium of 117 and elevated white 
count and lactic acidosis.  For these reasons, we were requested to evaluate 
the patient for admission to the ICU with infusion of hypertonic saline.

 

PAST MEDICAL HISTORY:  Significant for coronary artery disease, COPD, heart 
failure, factor V Leiden mutation, chronic hyponatremia, recent diagnosis of 
chronic severe erosive esophagitis, chronic alcoholism, phantom limb pain and 
chronic pain, and right above the knee amputation.

 

PAST SURGICAL HISTORY:  Includes right above the knee amputation.

 

MEDICATIONS:  The patient was released from [facility] without a medication 
list. The nursing staff is unable to confirm his medications at this time.

 

ALLERGIES:  Include allergy to FENTANYL.

 

FAMILY HISTORY:  Unknown.

 

SOCIAL HISTORY:  The patient does have history of tobacco abuse and history of 
alcoholism, both in remission.

 

REVIEW OF SYSTEMS:  His review of systems is unreliable.  The patient is 
postictal and confused.  He is wincing and grimacing in pain and retching and 
vomiting small amounts of foamy to bilious liquid.

 

                               PHYSICAL EXAMINATION

 

GENERAL:  Reveals a very disheveled gentleman older than his stated age.

 

VITAL SIGNS:  Blood pressure 126/76, heart rate of 96, respiratory rate of 18, 
O2 saturation 93% on 4 L nasal cannula, temperature of 97.8.

 

HEENT:  The patient is atraumatic, normocephalic.  PERRLA.  Nonicteric sclerae. 
Oral mucosa is dry.  The patient is edentulous.

 

NECK:  Supple, nontender.  No JVD noted.  No carotid bruits auscultated.

 

LUNGS:  Clear at the apices.  Diminished throughout.  Slight crackles at the 
bases with bilateral expiratory wheeze.  No rhonchi noted.

 

CARDIOVASCULAR:  S1, S2 present.  No murmurs, gallops, or rubs noted.  Rate and 
rhythm are regular.

 

ABDOMEN:  Soft, nontender, nondistended.  Moderately obese.  Positive bowel 
sounds noted.

 

:  A Schmidt has been inserted, draining medium-toned yellow urine.

 

MUSCULOSKELETAL:  There is no clubbing or cyanosis on the left lower extremity. 
The right lower extremity has been amputated above the knee.  The stump is 
benign.

 

NEUROLOGIC:  The patient is postictal, drowsy.  He does answer to his name.  He 
replies that he thinks he is currently in [facility], requires frequent 
reorientation and does appear to be confused at this time.

 

PSYCHIATRIC:  Although confused, he is cooperative and currently otherwise 
appropriate.

 

 DIAGNOSTIC STUDIES/LAB DATA:  WBCs 15.1, RBCs 5.32, hemoglobin 13.3, 
hematocrit 40, MCV 75, MCH 25, MCHC 33, RDW 19, platelets 297, MPV 8.2.  Sodium 
117, potassium 3.7, chloride 81, CO2 of 26, anion gap 10, BUN 6, creatinine 0.65
, .8, BUN 9.2, glucose 150.  Serum osmolality 255.  Lactic acid 2.2.  
Calcium 9.4.  Bilirubin 0.50, AST 17, ALT 7, alk phos 91.  Troponin 0.02.  CRP 
10.07.  BNP 94.  Protein 8.0.  Albumin 4.3, globulin 3.7, albumin-globulin 
ratio 1.2.  Toxicology serum alcohol is less than 10.  Arterial blood gas; pH 
is 7.42, CO2 of 40, pO2 of 93, bicarb 25.8, O2 saturation 99.0, base excess of 
1.3.  APTT 39.4, INR is 0.98.

 

Imaging:  Chest x-ray is pending official read, wet read shows some scarring 
and some potential fibrotic changes in the right lower lobe.  No acute changes 
from his previous chest x-ray in February of this year.  Does not appear to 
have any acute consolidation.

 

Brain CT shows no acute intracranial pathology.  He does have some prominence 
of the ventricles and sulci, most likely attributed to parenchymal volume loss, 
some chronic small vessel ischemic changes.

 

IMPRESSION:  Mr. Bowden is a 68-year-old male patient with a complex 
medical history who presents today with profound hyponatremia, status post 
seizure.

 

PLAN:  The patient will be admitted to ICU.

 

DIAGNOSES:

1.  Hyponatremia, acute on chronic.  Dr. Haines has been consulted by the 
emergency physician.  She recommends hypertonic saline infusion of 250 mL which 
is currently being started in the ED.  We will recheck sodium level in 2 to 3 
hours post infusion and check sodium levels q.6 hours thereafter.  Serum 
osmolality and FENa are being checked.  The patient is known to have chronic 
hyponatremia at baseline, however, this latest event with the seizure is new 
for him.  I think there is some question that the patient may have had seizure 
disorder secondary to alcoholism in the past; however, he has not had any 
alcohol that we are aware of since his discharge.  He has been outside of the 
[facility] for some 30 hours and his serum alcohol was negative, so it 
is likely that this is not related to that.  CT of his head is negative.  We 
should consult Neurology in the morning and continue to reassess the status 
after his sodium normalizes.

2.  In terms of his seizure, again we will consult Neurology in the morning and 
order an EEG and assess for any additional epileptiform changes.

3.  For his history of chronic erosive esophagitis, the patient did have 
gastrointestinal bleed and erosive esophagitis earlier this year with EGD with 
Dr. Campbell.  We will place him on Protonix for now.  We are pending 
clarification of his medications.  We will not place him on DVT prophylaxis 
because of his high risk for bleeding given his history of factor V Leiden and 
history of gastric bleeding. For now, we will place him on oral Protonix.

4.  For his history of chronic obstructive pulmonary disease, he is currently 
wheezy, does not appear to have consolidation.  We will place him on DuoNeb.  
He has had 1 dose of Solu-Medrol IV; however, he does not appear to be in a 
chronic obstructive pulmonary disease exacerbation at this time.  Also given 
his history of GI bleeding and erosive esophagitis, I would be concerned with 
putting him on IV steroids at this time unless it was really indicated.  This 
is something again that can be assessed after his hyponatremia and seizure are 
under control.

5.  History of coronary artery disease, currently stable.

6.  History of congestive heart failure.  There is no current or recent 
echocardiogram for the patient.  He does not appear to be in cardiac distress 
or volume overloaded on his chest x-ray.  I do not think we need to do an 
echocardiogram; however, if his volume status changes or he has any additional 
cardiac changes, we may need to do an echocardiogram during this admission.

7.  History of EtOH abuse and alcoholic encephalopathy.  In terms of the patient
's capacity, we should consider having the patient evaluated by Psychiatry 
again after his acute illness is over.  His disposition in terms of returning 
back to a nursing home whether it is [facility] or another facility should be 
discussed with the patient's family once disposition needs to be determined for 
discharge.  I did discuss DNR with the patient's son.  He did state that the 
patient is still a DNR, he does not have the patient's MOLST.  This would need 
to be signed during this admission.

8.  For DVT prophylaxis, the patient is high risk for bleeding.  
Chemoprophylaxis will be contraindicated.  He can have an SCD to his intact leg.

9.  Diet:  He can have a regular diet with pureed consistency.

10.  Ambulation:  For now, we will place the patient on bedrest.

11.  Disposition:  The patient has been admitted to ICU.

 

CRITICAL CARE TIME:  Approximately 75 minutes on admission planning.  This plan 
of care has been discussed with Dr. Michelle Aguilar, the attending on this 
admission, and she is in agreement with this plan of care.

 

 ____________________________________ CLEMENTINE CHRISTINE, NP

 

[id number] #: [id number]

[facility]BIO

## 2019-06-12 NOTE — CONS
CONSULTATION NOTE:

 

DATE OF CONSULT:  06/12/19

 

REQUESTING PHYSICIAN:  Dr. Trinidad/ER.

 

REASON FOR CONSULT:  Hyponatremia.

 

HISTORY OF PRESENT ILLNESS:  A 68-year-old male with history of coronary artery 
disease, COPD, heart failure, factor V Leiden mutation, chronic hyponatremia, 
severe erosive esophagitis, chronic alcoholism, phantom limb pain, right above-
the-knee amputation, and multiple medical problems, came into the ER after he 
was noted to have a generalized seizure when he was residing at his son's 
house.  The patient had been at [facility] for several months.  It also appears 
that the patient may have signed out against medical advice at [facility].  When 
the patient was evaluated in the ER, the patient was noted to have a sodium of 
117 with elevated white count and lactic acidosis.  The patient has a history 
of chronic hyponatremia with multiple episodes of acute on chronic hyponatremia 
per review of his old labs, significantly seen in January and February of this 
year.  The patient has been typically in the 125 to 128 range.  In light of his 
seizure and hyponatremia to 117, ER contacted me and recommended giving 3% 
saline 250 cc over an hour to get him past his seizure threshold and quickly 
increase his sodium.  The patient was examined this morning and appears to be 
somewhat confused with no focal deficits. The patient is hard of hearing, but 
able to answer questions appropriately and is alert and oriented.

 

PAST MEDICAL HISTORY:

1.  Coronary artery disease.

2.  COPD.

3.  Heart failure.

4.  Factor V Leiden mutation.

5.  Chronic hyponatremia.

6.  Chronic severe erosive esophagitis.

7.  Chronic alcoholism.

8.  Phantom limb syndrome.

9.  Chronic pain.

10.  Right above-the-knee amputation.

 

PAST SURGICAL HISTORY:  Right above-the-knee amputation.

 

MEDICATION LIST:

1.  Trazodone 100 mg p.o. at bedtime p.r.n.

2.  Guaifenesin 1200 p.o. b.i.d.

3.  Sucralfate 1 g p.o. a.c. h.s.

4.  Senna.

5.  Pantoprazole 40 mg b.i.d.

6.  Nystatin powder.

7.  Nicotine inhaler.

8.  Lisinopril 2.5 mg p.o. daily.

9.  DuoNeb p.r.n.

10.  Hydrocodone/acetaminophen 1 tab p.o. t.i.d. p.r.n.

11.  Gabapentin 300 mg p.o. t.i.d.

12.  Duloxetine 30 mg p.o. daily.

13.  Benzonatate (Tessalon Perles) 100 mg p.o. b.i.d. p.r.n.

14.  Augmentin 875 mg p.o. b.i.d.

15.  Tylenol.

 

FAMILY HISTORY:  Unknown.

 

SOCIAL HISTORY:  The patient reports history of alcohol use, but reports that 
he has not been drinking recently.  Has a history of heavy alcohol abuse, but 
currently reports that he does not drink.

 

REVIEW OF SYSTEMS:  Denies any complaints at that time.  Reports that he is 
having some leg pain and other than that does not complain of any headaches, 
dizziness. Reports that he is feeling well.

 

PHYSICAL EXAM:  General:  He is disheveled, older than stated age.  Vitals:  
Blood pressure 126/76, heart rate 96, respiratory rate 18, oxygen saturation 93
% on 4 L nasal cannula, temperature 97.8.  HEENT:  NC/AT.  No scleral icterus.  
Oral mucosa dry.  Neck:  No JVD noted.  Lungs:  Decreased breath sounds 
bilaterally, minimal crackles at the bases and some expiratory wheezes.  
Cardiovascular:  S1, S2 present.  Tachycardic at the time of exam.  Abdomen:  
Soft, nontender, nondistended, moderately obese.  No rebound.  No guarding.  
Extremities noted to have right above-the-knee amputation, other leg with no 
edema.

 

DIAGNOSTIC STUDIES/LAB DATA:  WBC 15.1 yesterday, hemoglobin 13.3, hematocrit 40
, platelets noted to be 297.  On admission, sodium 117, potassium 3.7, chloride 
81, CO2 of 26, BUN 6, creatinine 0.6, glucose noted to be 150.  BNP noted to be 
94.

 

Chest x-ray:  Not with significant consolidation.

 

Brain CT:  No acute intracranial pathology.

 

ASSESSMENT AND PLAN:  A 68-year-old male with multiple medical problems and 
chronic hyponatremia, here with profound hyponatremia and seizure.

 

1.  Hyponatremia, acute on chronic, hypotonic hyponatremia.  At this time, the 
patient has received his 3% 250 cc and this has nicely increased the patient's 
sodium to get him out of the seizure threshold.  At this time on repeat labs, 
his sodium noted to be 125, which is at goal.  For the first 24 hours, we would 
like the sodium to not increase beyond 10 mEq per day, ideally keeping him in 
the 8 mEq per day range.  As his sodium has improved and the patient's seizure 
has resolved, recommend not pursuing further correction of his hyponatremia 
today.  The patient is currently on fluid restriction.  We will lift his fluid 
restriction today to avoid overcorrection of his sodium to avoid complications 
of central pontine myelinolysis.  The patient at this time is doing well, does 
not have any focal deficits.  The patient can be reinitiated on his fluid 
restriction if needed tomorrow and recommend checking his sodium levels again.  
The etiology of his hyponatremia is likely multifactorial.  The patient's urine 
osmolality also noted to be low with low urine sodium, which does not 
completely go with a syndrome of inappropriate antidiuretic hormone secretion 
picture.  It is still possible that the patient has incomplete SIADH or SIADH 
and hypovolemia.  Most likely, the patient has low solute intake and 
hyponatremia related to it in light of his malnutrition and chronic alcoholism.
  The patient currently does not drink a lot of beer even though he used to 
drink heavily in the past.  Reset osmostat is also a consideration in this 
patient as his urine osmolality is low, but not significantly low and could 
have an incomplete picture. Recommend repeating a urine osmolality, serum 
osmolality tomorrow.  The patient's normal uric acid level also goes against 
syndrome of inappropriate antidiuretic hormone secretion.  Hypovolemia is also 
a consideration and low solute intake in the setting of his chronic medical 
problems and malnutrition.

2.  TSH and cortisol level noted to be in normal range.  Also recommend 
checking aldosterone and renin level to rule out mineralocorticoid deficiency 
as a cause of the patient's hyponatremia.

3.  Interestingly, the patient's prior x-ray had right lower lobe 
consolidation. The patient's current x-ray does not show any mass or 
adenocarcinoma sort of lesion.  However, if the hyponatremia is persistent, in 
light of his history, can consider getting CAT scan of his chest to 
conclusively rule out lung malignancy that could be triggering hyponatremia and 
syndrome of inappropriate antidiuretic hormone secretion.

4.  We will follow with the primary medical team.

 

 [id number]/CPS #: [id number]

RUBIO

## 2019-06-13 LAB
ALBUMIN SERPL BCG-MCNC: 4.1 G/DL (ref 3.2–5.2)
ALBUMIN/GLOB SERPL: 1.3 {RATIO} (ref 1–3)
ALP SERPL-CCNC: 70 U/L (ref 34–104)
ALT SERPL W P-5'-P-CCNC: 7 U/L (ref 7–52)
ANION GAP SERPL CALC-SCNC: 9 MMOL/L (ref 2–11)
AST SERPL-CCNC: 18 U/L (ref 13–39)
BASOPHILS # BLD AUTO: 0.2 10^3/UL (ref 0–0.2)
BUN SERPL-MCNC: 10 MG/DL (ref 6–24)
BUN/CREAT SERPL: 16.1 (ref 8–20)
CALCIUM SERPL-MCNC: 9.2 MG/DL (ref 8.6–10.3)
CHLORIDE SERPL-SCNC: 95 MMOL/L (ref 101–111)
EOSINOPHIL # BLD AUTO: 0 10^3/UL (ref 0–0.6)
GLOBULIN SER CALC-MCNC: 3.2 G/DL (ref 2–4)
GLUCOSE SERPL-MCNC: 97 MG/DL (ref 70–100)
HCO3 SERPL-SCNC: 27 MMOL/L (ref 22–32)
HCT VFR BLD AUTO: 37 % (ref 42–52)
HGB BLD-MCNC: 12.1 G/DL (ref 14–18)
LYMPHOCYTES # BLD AUTO: 2 10^3/UL (ref 1–4.8)
MCH RBC QN AUTO: 25 PG (ref 27–31)
MCHC RBC AUTO-ENTMCNC: 32 G/DL (ref 31–36)
MCV RBC AUTO: 77 FL (ref 80–94)
MONOCYTES # BLD AUTO: 1.9 10^3/UL (ref 0–0.8)
NEUTROPHILS # BLD AUTO: 8.8 10^3/UL (ref 1.5–7.7)
NRBC # BLD AUTO: 0 10^3/UL
NRBC BLD QL AUTO: 0
PLATELET # BLD AUTO: 290 10^3/UL (ref 150–450)
POTASSIUM SERPL-SCNC: 4.1 MMOL/L (ref 3.5–5)
PROT SERPL-MCNC: 7.3 G/DL (ref 6.4–8.9)
RBC # BLD AUTO: 4.87 10^6 /UL (ref 4.18–5.48)
RBC UR QL AUTO: (no result)
SODIUM SERPL-SCNC: 131 MMOL/L (ref 135–145)
WBC # BLD AUTO: 12.9 10^3/UL (ref 3.5–10.8)
WBC UR QL AUTO: (no result)

## 2019-06-13 RX ADMIN — GABAPENTIN SCH MG: 300 CAPSULE ORAL at 21:13

## 2019-06-13 RX ADMIN — ACETAMINOPHEN PRN MG: 325 TABLET ORAL at 21:13

## 2019-06-13 RX ADMIN — TIOTROPIUM BROMIDE SCH CAP: 18 CAPSULE ORAL; RESPIRATORY (INHALATION) at 09:50

## 2019-06-13 RX ADMIN — GABAPENTIN SCH MG: 300 CAPSULE ORAL at 07:57

## 2019-06-13 RX ADMIN — PANTOPRAZOLE SODIUM SCH MG: 40 TABLET, DELAYED RELEASE ORAL at 07:58

## 2019-06-13 NOTE — CONSULT
Palliative / Hospice Consult


Ordering Provider: Pernell Trinidad - PCP-Fidencio


Referal Reason: 





care after leaving the hospital





- Subjective


Code Status: DNR


Advance Directives Location: No Advance Directives


MOLST Part A Completed: Yes - on  chart


MOLST Part E Completed:: Yes - on chart





- History or Present Illness


History or Present Illness: 


69 yo male with seizure secondary to hyponatremia and COPD recently left 

[facility] and was living with son Mushtaq. PMH CAD-MI, CHF, factor V Leiden 

mutation, chronic hyponatremia, severe erosive esophagitis, chronic alcoholism, 

phantom leg pain & PVD. Studies CXR-COPD and bilat ateletasis, EKG-NSR, brain CT

-chronic small vessel ischemic changes otherwise no acute changes, EEG-

nonspecific mild-moderate diffuse encephalopathy, H/H 12.1/37, BUN/Cr 10/.62, 

egfr 128.6, Ca 9.2, tprot 7.3 and Alb 4.1. Pt is a smoker/ex etoh abuse, no 

drugs, retired from [employer] on disability for 40yrs, one son passed 

away other son Mushtaq is his HCP. Pt was recently released from [facility] and 

was at home with his son for less than 40hrs when he developed a seizure and 

was brought to the hospital. Pt has resided at [facility] in the past. All 

history is from the son and medical records, pt is unable to contribute.


Lab Values: 


 Abnormal Lab Results











  06/13/19 06/13/19 06/13/19





  05:29 05:29 05:29


 


WBC  12.9 H  


 


RBC  4.87  


 


Hgb  12.1 L  


 


Hct  37 L  


 


MCV  77 L  


 


MCH  25 L  


 


MCHC  32  


 


RDW  19 H  


 


Plt Count  290  


 


MPV  8.8  


 


Neut % (Auto)  68.3  


 


Lymph % (Auto)  15.4  


 


Mono % (Auto)  14.8  


 


Eos % (Auto)  0.2  


 


Baso % (Auto)  1.3  


 


Absolute Neuts (auto)  8.8 H  


 


Absolute Lymphs (auto)  2.0  


 


Absolute Monos (auto)  1.9 H  


 


Absolute Eos (auto)  0.0  


 


Absolute Basos (auto)  0.2  


 


Absolute Nucleated RBC  0.0  


 


Nucleated RBC %  0.0  


 


Sodium   131 L 


 


Potassium   4.1 


 


Chloride   95 L 


 


Carbon Dioxide   27 


 


Anion Gap   9 


 


BUN   10 


 


Creatinine   0.62 L 


 


Est GFR ( Amer)   155.6 


 


Est GFR (Non-Af Amer)   128.6 


 


BUN/Creatinine Ratio   16.1 


 


Glucose   97 


 


Serum Osmolality    281


 


Calcium   9.2 


 


Total Bilirubin   0.30 


 


AST   18 


 


ALT   7 


 


Alkaline Phosphatase   70 


 


Total Protein   7.3 


 


Albumin   4.1 


 


Globulin   3.2 


 


Albumin/Globulin Ratio   1.3 


 


Urine Color   


 


Urine Appearance   


 


Urine pH   


 


Ur Specific Gravity   


 


Urine Protein   


 


Urine Ketones   


 


Urine Blood   


 


Urine Nitrate   


 


Urine Bilirubin   


 


Urine Urobilinogen   


 


Ur Leukocyte Esterase   


 


Urine WBC (Auto)   


 


Urine RBC (Auto)   


 


Ur Squamous Epith Cells   


 


Urine Bacteria   


 


Urine Glucose   














  06/13/19





  14:30


 


WBC 


 


RBC 


 


Hgb 


 


Hct 


 


MCV 


 


MCH 


 


MCHC 


 


RDW 


 


Plt Count 


 


MPV 


 


Neut % (Auto) 


 


Lymph % (Auto) 


 


Mono % (Auto) 


 


Eos % (Auto) 


 


Baso % (Auto) 


 


Absolute Neuts (auto) 


 


Absolute Lymphs (auto) 


 


Absolute Monos (auto) 


 


Absolute Eos (auto) 


 


Absolute Basos (auto) 


 


Absolute Nucleated RBC 


 


Nucleated RBC % 


 


Sodium 


 


Potassium 


 


Chloride 


 


Carbon Dioxide 


 


Anion Gap 


 


BUN 


 


Creatinine 


 


Est GFR ( Amer) 


 


Est GFR (Non-Af Amer) 


 


BUN/Creatinine Ratio 


 


Glucose 


 


Serum Osmolality 


 


Calcium 


 


Total Bilirubin 


 


AST 


 


ALT 


 


Alkaline Phosphatase 


 


Total Protein 


 


Albumin 


 


Globulin 


 


Albumin/Globulin Ratio 


 


Urine Color  Yellow


 


Urine Appearance  Clear


 


Urine pH  6.0


 


Ur Specific Gravity  1.013


 


Urine Protein  1+(30 mg/dl) A


 


Urine Ketones  Negative


 


Urine Blood  1+ A


 


Urine Nitrate  Negative


 


Urine Bilirubin  Negative


 


Urine Urobilinogen  Negative


 


Ur Leukocyte Esterase  Negative


 


Urine WBC (Auto)  Trace(0-5/hpf)


 


Urine RBC (Auto)  3+(>10/hpf) A


 


Ur Squamous Epith Cells  Present A


 


Urine Bacteria  Absent


 


Urine Glucose  Negative








 Laboratory Last Values











WBC  12.9 10^3/uL (3.5-10.8)  H  06/13/19  05:29    


 


RBC  4.87 10^6 /uL (4.18-5.48)   06/13/19  05:29    


 


Hgb  12.1 g/dL (14.0-18.0)  L  06/13/19  05:29    


 


Hct  37 % (42-52)  L  06/13/19  05:29    


 


MCV  77 fL (80-94)  L  06/13/19  05:29    


 


MCH  25 pg (27-31)  L  06/13/19  05:29    


 


MCHC  32 g/dL (31-36)   06/13/19  05:29    


 


RDW  19 % (10-15)  H  06/13/19  05:29    


 


Plt Count  290 10^3/uL (150-450)   06/13/19  05:29    


 


MPV  8.8 fL (7.4-10.4)   06/13/19  05:29    


 


Neut % (Auto)  68.3 %  06/13/19  05:29    


 


Lymph % (Auto)  15.4 %  06/13/19  05:29    


 


Mono % (Auto)  14.8 %  06/13/19  05:29    


 


Eos % (Auto)  0.2 %  06/13/19  05:29    


 


Baso % (Auto)  1.3 %  06/13/19  05:29    


 


Absolute Neuts (auto)  8.8 10^3/ul (1.5-7.7)  H  06/13/19  05:29    


 


Absolute Lymphs (auto)  2.0 10^3/ul (1.0-4.8)   06/13/19  05:29    


 


Absolute Monos (auto)  1.9 10^3/ul (0-0.8)  H  06/13/19  05:29    


 


Absolute Eos (auto)  0.0 10^3/ul (0-0.6)   06/13/19  05:29    


 


Absolute Basos (auto)  0.2 10^3/ul (0-0.2)   06/13/19  05:29    


 


Absolute Nucleated RBC  0.0 10^3/ul  06/13/19  05:29    


 


Nucleated RBC %  0.0   06/13/19  05:29    


 


INR (Anticoag Therapy)  0.98  (0.82-1.09)   06/11/19  20:34    


 


APTT  39.4 seconds (26.0-38.0)  H  06/11/19  20:34    


 


ABG pH  7.42  (7.35-7.45)   06/11/19  20:53    


 


ABG pCO2  40 mmHg (35-45)   06/11/19  20:53    


 


ABG pO2  93 mmHg ()   06/11/19  20:53    


 


ABG HCO3  25.8 mmol/L (19-31)   06/11/19  20:53    


 


ABG O2 Saturation  99.0 % (94.0-98.0)  H  06/11/19  20:53    


 


ABG Base Excess  1.3 mmol/L (-2.0-2.0)   06/11/19  20:53    


 


Sodium  131 mmol/L (135-145)  L  06/13/19  05:29    


 


Potassium  4.1 mmol/L (3.5-5.0)   06/13/19  05:29    


 


Chloride  95 mmol/L (101-111)  L  06/13/19  05:29    


 


Carbon Dioxide  27 mmol/L (22-32)   06/13/19  05:29    


 


Anion Gap  9 mmol/L (2-11)   06/13/19  05:29    


 


BUN  10 mg/dL (6-24)   06/13/19  05:29    


 


Creatinine  0.62 mg/dL (0.67-1.17)  L  06/13/19  05:29    


 


Est GFR ( Amer)  155.6  (>60)   06/13/19  05:29    


 


Est GFR (Non-Af Amer)  128.6  (>60)   06/13/19  05:29    


 


BUN/Creatinine Ratio  16.1  (8-20)   06/13/19  05:29    


 


Glucose  97 mg/dL ()   06/13/19  05:29    


 


Serum Osmolality  281 mOsm/kg (275-295)   06/13/19  05:29    


 


Lactic Acid  0.9 mmol/L (0.5-2.0)   06/12/19  01:00    


 


Uric Acid  6.3 mg/dL (4.4-7.6)   06/12/19  11:16    


 


Calcium  9.2 mg/dL (8.6-10.3)   06/13/19  05:29    


 


Total Bilirubin  0.30 mg/dL (0.2-1.0)   06/13/19  05:29    


 


AST  18 U/L (13-39)   06/13/19  05:29    


 


ALT  7 U/L (7-52)   06/13/19  05:29    


 


Alkaline Phosphatase  70 U/L ()   06/13/19  05:29    


 


Troponin I  0.02 ng/mL (<0.04)   06/11/19  20:34    


 


C-Reactive Protein  10.07 mg/L (<8.01)  H  06/11/19  20:34    


 


B-Natriuretic Peptide  94 pg/mL (<=100)   06/11/19  20:34    


 


Total Protein  7.3 g/dL (6.4-8.9)   06/13/19  05:29    


 


Albumin  4.1 g/dL (3.2-5.2)   06/13/19  05:29    


 


Globulin  3.2 g/dL (2-4)   06/13/19  05:29    


 


Albumin/Globulin Ratio  1.3  (1-3)   06/13/19  05:29    


 


TSH  3.72 mcIU/mL (0.34-5.60)   06/11/19  20:34    


 


Cortisol  6.03 mcg/dL  06/12/19  05:40    


 


Urine Color  Yellow   06/13/19  14:30    


 


Urine Appearance  Clear   06/13/19  14:30    


 


Urine pH  6.0  (5-9)   06/13/19  14:30    


 


Ur Specific Gravity  1.013  (1.010-1.030)   06/13/19  14:30    


 


Urine Protein  1+(30 mg/dl)  (Negative)  A  06/13/19  14:30    


 


Urine Ketones  Negative  (Negative)   06/13/19  14:30    


 


Urine Blood  1+  (Negative)  A  06/13/19  14:30    


 


Urine Nitrate  Negative  (Negative)   06/13/19  14:30    


 


Urine Bilirubin  Negative  (Negative)   06/13/19  14:30    


 


Urine Urobilinogen  Negative  (Negative)   06/13/19  14:30    


 


Ur Leukocyte Esterase  Negative  (Negative)   06/13/19  14:30    


 


Urine WBC (Auto)  Trace(0-5/hpf)  (Absent)   06/13/19  14:30    


 


Urine RBC (Auto)  3+(>10/hpf)  (Absent)  A  06/13/19  14:30    


 


Ur Squamous Epith Cells  Present  (Absent)  A  06/13/19  14:30    


 


Urine Bacteria  Absent  (Absent)   06/13/19  14:30    


 


Hyaline Casts  Present  (Absent)  A  06/11/19  22:48    


 


Urine Osmolality  184 mOsm/kg (100-1150)   06/11/19  22:48    


 


U Sodium Concentration  28 mmol/L  06/11/19  22:48    


 


Urine Potassium  21.4 mmol/L  06/11/19  22:48    


 


Ur Chloride Concentrat  27 mmol/L  06/11/19  22:48    


 


Urine Glucose  Negative  (Negative)   06/13/19  14:30    


 


Urine Opiates Screen  None detected  (None Detect)   06/11/19  22:48    


 


Ur Barbiturates Screen  None detected  (None Detect)   06/11/19  22:48    


 


Ur Phencyclidine Scrn  None detected  (None Detect)   06/11/19  22:48    


 


Ur Amphetamines Screen  None detected  (None Detect)   06/11/19  22:48    


 


U Benzodiazepines Scrn  None detected  (None Detect)   06/11/19  22:48    


 


Urine Cocaine Screen  None detected  (None Detect)   06/11/19  22:48    


 


U Cannabinoids Screen  None detected  (None Detect)   06/11/19  22:48    


 


Serum Alcohol  < 10 mg/dL (<10)   06/11/19  20:34    














- Objective


Active Medications: 








Acetaminophen (Tylenol Tab*)  650 mg PO Q6H PRN


   PRN Reason: FEVER


   Last Admin: 06/12/19 20:35 Dose:  650 mg


Albuterol (Ventolin 2.5 Mg/3 Ml Neb.Sol*)  2.5 mg INH .Q20M [facility]


Albuterol/Ipratropium (Duoneb (Albuterol 2.5 Mg/Ipratropium 0.5 Mg))  1 neb INH 

[id number] AWAKE PRN


   PRN Reason: SOB/WHEEZING


   Last Admin: 06/13/19 14:09 Dose:  1 neb


Gabapentin (Neurontin Cap(*))  600 mg PO BID [facility]


   Last Admin: 06/13/19 07:57 Dose:  600 mg


Ceftriaxone Sodium 1 gm/ (Sodium Chloride)  50 mls @ 200 mls/hr IVPB Q24H [facility]


   Last Admin: 06/12/19 23:32 Dose:  200 mls/hr


Morphine Sulfate (Morphine 4 Mg/Ml Vial (1 Ml))  4 mg IV Q4H PRN


   PRN Reason: PAIN


   Last Admin: 06/12/19 08:59 Dose:  4 mg


Ondansetron HCl (Zofran Inj*)  4 mg IV Q6H PRN


   PRN Reason: NAUSEA


Pantoprazole Sodium (Protonix Tab*)  40 mg PO DAILY [facility]


   Last Admin: 06/13/19 07:58 Dose:  40 mg


Tiotropium Bromide (Spiriva Cap.Inh*)  1 cap INH DAILY [facility]


   Last Admin: 06/13/19 09:50 Dose:  1 cap








Vital Signs: 


Vital Signs:











Temp Pulse Resp BP Pulse Ox


 


 98.2 F   86   18   120/83   97 


 


 06/13/19 08:00  06/13/19 14:09  06/13/19 14:09  06/13/19 08:00  06/13/19 14:09











Patient Weight: 


 





Weight                           78.925 kg








Intake and Output: 


 Intake & Output











 06/11/19 06/12/19 06/13/19 06/14/19





 06:59 06:59 06:59 06:59


 


Intake Total  8702 213 4150


 


Output Total  2600 1175 


 


Balance  -1268 -540 1450


 


Weight  81 kg 78.925 kg 


 


Intake:    


 


  IV Fluids  1252 25 10


 


    NS to Maintain IV Patency  2 25 10


 


  IVPB  50 60 


 


    Ceftiaxone   60 


 


    NS to Maintain IV Patency  50  


 


  Oral  30 550 1440


 


Output:    


 


  Urine   450 


 


  Schmidt  1800 725 


 


  Residual  800  


 


    Schmidt 16 Fr  800  


 


Other:    


 


  Date of Last Bowel    unknown





  Movement    


 


  # Bowel Movements   0 0


 


  # Voids    1





 





ADLs: Meal  Record                                         Start:  06/11/19 22:

47


Freq:   09,13,18                                           Status: Complete    

  


Protocol:                                                                      

  


 Created      06/11/19 22:47  System  (Rec: 06/11/19 22:47  System  ICU-C12)


 Document     06/12/19 09:00  [id number]  (Rec: 06/12/19 09:11  [id number]  ICU-C10)


 Document     06/12/19 13:30  [id number]  (Rec: 06/12/19 13:59  [id number]  ICU-C10)


ADLs: Meal  Record                                         Start:  06/12/19 16:

46


Freq:   DAILY@0900,1400,1800                               Status: Active      

  


Protocol:                                                                      

  


 Created      06/12/19 16:46  [id number]  (Rec: 06/12/19 16:46  [id number]  ICU-C16)


 Document     06/13/19 08:44  [id number]  (Rec: 06/13/19 08:44  [id number]  MED-C09)


 Document     06/13/19 14:00  [id number]  (Rec: 06/13/19 14:25  [id number]  MED-C09)


Intake and Output                                          Start:  06/11/19 20:

20


Freq:                                                      Status: Active      

  


Protocol:                                                                      

  


 Created      06/11/19 20:20  System  (Rec: 06/11/19 20:20  System  [id number])


Intake and Output                                          Start:  06/11/19 22:

47


Freq:   Q1HR                                               Status: Complete    

  


Protocol:                                                                      

  


 Created      06/11/19 22:47  System  (Rec: 06/11/19 22:47  System  ICU-C12)


 Document     06/12/19 00:00  [id number]  (Rec: 06/12/19 00:51  [id number]  ICU-C16)


 Document     06/12/19 00:54  [id number]  (Rec: 06/12/19 00:54  [id number]  ICU-C16)


 Document     06/12/19 01:51  [id number]  (Rec: 06/12/19 01:51  [id number]  ICU-C16)


 Document     06/12/19 02:58  [id number]  (Rec: 06/12/19 02:59  [id number]  ICU-C16)


 Document     06/12/19 04:00  [id number]  (Rec: 06/12/19 04:54  [id number]  ICU-C16)


 Document     06/12/19 04:54  [id number]  (Rec: 06/12/19 04:54  [id number]  ICU-C16)


 Document     06/12/19 05:45  [id number]  (Rec: 06/12/19 05:45  [id number]  ICU-C16)


 Document     06/12/19 06:47  [id number]  (Rec: 06/12/19 06:49  [id number]  ICU-C16)


 Document     06/12/19 07:25  [id number]  (Rec: 06/12/19 07:25  [id number]  ICU-C10)


 Document     06/12/19 09:00  [id number]  (Rec: 06/12/19 09:02  [id number]  ICU-M35)


 Document     06/12/19 11:26  [id number]  (Rec: 06/12/19 11:27  [id number]  ICU-C10)


 Document     06/12/19 13:00  [id number]  (Rec: 06/12/19 13:23  [id number]  ICU-C10)


 Document     06/12/19 13:58  [id number]  (Rec: 06/12/19 13:59  [id number]  ICU-C10)


Intake and Output                                          Start:  06/12/19 16:

46


Freq:   DAILY@0600,1400,2200                               Status: Active      

  


Protocol:                                                                      

  


 Created      06/12/19 16:46  [id number]  (Rec: 06/12/19 16:46  [id number]  ICU-C16)


 Document     06/13/19 06:00  [id number]  (Rec: 06/13/19 06:06  [id number]  MED-C16)


 Document     06/13/19 14:00  [id number]  (Rec: 06/13/19 15:16  [id number]  MED-C09)








Eyes: No Scleral Icterus


Ears/Nose/Mouth/Throat: - - poor dentition


Neck: NL Appearance and Movements; NL JVP


Cardiovascular: NL Sounds; No Murmurs; No JVD, No Edema


Abdominal: NL Sounds; No Tenderness; No Distention, No Hepatosplenomegaly





- Assessment


Assessment: 


69 yo male with hyponatremia, COPD and erosive esophagitis 





- Plan


Consult Plan (MU): Palliative


Plan: 


Spoke with pt and son separately. Pt would like to live with son he would 

prefer not to go to SNF. Son is agreeable to caring for his dad at home. 

Recommended VNS with AIM program for the extra support. Son also requesting a 

padded wheelchair. Case management is aware. Son had 3 questions first whether 

his dad still needs to be on a pureed diet saying he doesn't have a  to 

puree the food. Second he was inquiring if it is possible to get some 

Disulfiram in case his dad starts drinking again. Third son is concerned his 

dad may not have adequate pain relief although not sure pain management is an 

issue in the hospital.  Son has made reference to the fact that he thinks his 

dad has 1-2 more years left before dying. Pt is not hospice eligible at this 

time. KPS 60%, PPS 60%.   





- Time On Unit


Date of Evaluation: 06/13/19


Hospice Consult Time in: 15:00


Hospice Consult Time Out: 16:30


Hospice Consult Time Total: 90


> 50% of Time Spend In Counseling or Coordinating Care: Yes

## 2019-06-13 NOTE — PN
Subjective


Date of Service: 06/13/19


Interval History: 





Patient has recovered greatly since yesterday. He is answering questions, able 

to get from bed to chair and use wheelchair.


He has threatened to leave hospital and smoke a cigarette.


He denies dyspnea, chest pain, abdo pain, headache.





Dr. Romero spoke with son, there was concern about reason for pureed food.  

Patient had swallow eval yesterday, cannot chew.


Apparently son Mushtaq does not want him to return to [facility], or anywhere 

outside [place]. He was also asking about pain control, and antabuse.





Family History: Unchanged from Admission


Social History: Unchanged from Admission


Past Medical History: Unchanged from Admission





Objective


Active Medications: 








Acetaminophen (Tylenol Tab*)  650 mg PO Q6H PRN


   PRN Reason: FEVER


   Last Admin: 06/12/19 20:35 Dose:  650 mg


Albuterol (Ventolin 2.5 Mg/3 Ml Neb.Sol*)  2.5 mg INH .Q20M [facility]


Albuterol/Ipratropium (Duoneb (Albuterol 2.5 Mg/Ipratropium 0.5 Mg))  1 neb INH 

RT.Y5CI-THREW AWAKE PRN


   PRN Reason: SOB/WHEEZING


   Last Admin: 06/13/19 14:09 Dose:  1 neb


Gabapentin (Neurontin Cap(*))  600 mg PO BID [facility]


   Last Admin: 06/13/19 07:57 Dose:  600 mg


Ceftriaxone Sodium 1 gm/ (Sodium Chloride)  50 mls @ 200 mls/hr IVPB Q24H [facility]


   Last Admin: 06/12/19 23:32 Dose:  200 mls/hr


Morphine Sulfate (Morphine 4 Mg/Ml Vial (1 Ml))  4 mg IV Q4H PRN


   PRN Reason: PAIN


   Last Admin: 06/12/19 08:59 Dose:  4 mg


Ondansetron HCl (Zofran Inj*)  4 mg IV Q6H PRN


   PRN Reason: NAUSEA


Pantoprazole Sodium (Protonix Tab*)  40 mg PO DAILY [facility]


   Last Admin: 06/13/19 07:58 Dose:  40 mg


Tiotropium Bromide (Spiriva Cap.Inh*)  1 cap INH DAILY [facility]


   Last Admin: 06/13/19 09:50 Dose:  1 cap








 Vital Signs - 8 hr











  06/13/19 06/13/19 06/13/19





  09:54 10:56 14:09


 


Pulse Rate 75  86


 


Respiratory 18 16 18





Rate   


 


O2 Sat by Pulse 92  97





Oximetry   











Oxygen Devices in Use Now: Nasal Cannula


Appearance: alert, no distress


Eyes: No Scleral Icterus


Ears/Nose/Mouth/Throat: - - poor dentition


Neck: NL Appearance and Movements; NL JVP


Respiratory: Symmetrical Chest Expansion and Respiratory Effort, - - diminished


Cardiovascular: NL Sounds; No Murmurs; No JVD


Neurological: - - oriented to person, place


Lines/Tubes/Other Access: Clean, Dry and Intact Peripheral IV


Nutrition: Taking PO's


Result Diagrams: 


 06/13/19 05:29





 06/13/19 05:29


Additional Lab and Data: 





 Laboratory Tests











  06/13/19 06/13/19





  05:29 05:29


 


Glucose  97 


 


Serum Osmolality   281

















Microbiology and Other Data: 


 Microbiology











 06/11/19 23:55 Nasal Screen MRSA (PCR) - Final





 Nasal    Mrsa Not Detected














Assess/Plan/Problems-Billing


Assessment: 


68 year old man with history of COPD, alcohol abuse, here with severe 

hyponatremia, seizure.








- Patient Problems


(1) Hyponatremia


Current Visit: Yes   Status: Acute   Priority: High   Code(s): E87.1 - HYPO-

OSMOLALITY AND HYPONATREMIA   SNOMED Code(s): 17570197


   Comment: - Acute on chronic


- Patient corrected nicely with hypertonic saline, now on fluid restriction


- Improved again today. Will recheck in AM.


- Likely near baseline tomorrow.   





(2) COPD (chronic obstructive pulmonary disease)


Current Visit: Yes   Status: Acute   Priority: Medium   Code(s): J44.9 - 

CHRONIC OBSTRUCTIVE PULMONARY DISEASE, UNSPECIFIED   SNOMED Code(s): 02701093


   Comment: 


- No current sign of COPD exacerbation


- Restart spiriva and duonebs prn, stop ceftriaxone   





(3) Seizures


Current Visit: Yes   Status: Acute   Priority: Medium   Code(s): R56.9 - 

UNSPECIFIED CONVULSIONS   SNOMED Code(s): 00062764


   Comment: -Seizure appears due to hyponatremia


-EEG normal, neurology input appreciated.   





(4) Erosive esophagitis


Current Visit: No   Status: Acute   Priority: Medium   Code(s): K22.10 - ULCER 

OF ESOPHAGUS WITHOUT BLEEDING   SNOMED Code(s): 89825547


   Comment: 


 - reviewed past GI consult


 - Continue PO Protonix QD


 - Continue to avoid anticoagulation at this time.    





(5) DVT prophylaxis


Current Visit: No   Status: Acute   Priority: Low   Code(s): LDO6264 -    

SNOMED Code(s): 526323423


   Comment: 


 - SCDs, high risk for bleeding   


Status and Disposition: 





will discuss home care with daughter-in-law Sommer [phone]

## 2019-06-14 VITALS — DIASTOLIC BLOOD PRESSURE: 61 MMHG | SYSTOLIC BLOOD PRESSURE: 120 MMHG

## 2019-06-14 LAB
ANION GAP SERPL CALC-SCNC: 5 MMOL/L (ref 2–11)
BUN SERPL-MCNC: 9 MG/DL (ref 6–24)
BUN/CREAT SERPL: 15.3 (ref 8–20)
CALCIUM SERPL-MCNC: 9 MG/DL (ref 8.6–10.3)
CHLORIDE SERPL-SCNC: 93 MMOL/L (ref 101–111)
GLUCOSE SERPL-MCNC: 102 MG/DL (ref 70–100)
HCO3 SERPL-SCNC: 31 MMOL/L (ref 22–32)
POTASSIUM SERPL-SCNC: 4.5 MMOL/L (ref 3.5–5)
SODIUM SERPL-SCNC: 129 MMOL/L (ref 135–145)

## 2019-06-14 RX ADMIN — GABAPENTIN SCH MG: 300 CAPSULE ORAL at 07:35

## 2019-06-14 RX ADMIN — PANTOPRAZOLE SODIUM SCH MG: 40 TABLET, DELAYED RELEASE ORAL at 07:36

## 2019-06-14 RX ADMIN — TIOTROPIUM BROMIDE SCH CAP: 18 CAPSULE ORAL; RESPIRATORY (INHALATION) at 07:51

## 2019-06-14 NOTE — PN
PROGRESS NOTE:

 

DATE OF SERVICE:  06/14/19

 

SUBJECTIVE:  The patient was seen and examined at bedside.  The patient, alert, and oriented, able to
 answer all questions today, oriented x3.  Vitals and labs have been reviewed.  Sodium back to baseli
ne.

 

PHYSICAL EXAM:  HEENT:  NCAT.  Heart:  S1, S2 present, tachycardic at the time of exam.  Lungs:  Decr
eased breath sounds bilaterally.  Abdomen:  Soft.  Extremities: Noted to have AKA and the other extre
mity with no edema.  Neuro:  Alert and oriented.

 

ASSESSMENT AND PLAN:

1.  Acute on chronic hypotonic hyponatremia, which has corrected nicely with 3%. Etiology likely seco
ndary to low solute intake.  In light of his malnutrition and chronic alcoholism, reset osmostat is a
lso a consideration.  The patient's sodium has corrected to his baseline.  The patient's chest x-ray 
has also been reviewed, not suggestive of any mass and only noted to have linear atelectasis at the l
evie bases and features of chronic obstructive pulmonary disease.

2.  Can continue free water fluid restriction; however, recommend nutrition eval and improved solute 
intake with no sodium restriction.

3.  Will follow up on as needed basis with primary medical team.

 

 [id number] #: [id number]

## 2019-06-15 NOTE — DS
DISCHARGE SUMMARY:

 

DATE OF ADMISSION:  06/11/19

 

DATE OF DISCHARGE:  06/14/19

 

ADMITTING PROVIDER:  Nesha Carcamo NP

 

PRIMARY CARE PHYSICIAN:  Randi Nicolas MD

 

CHIEF COMPLAINT:  Generalized seizure, altered mental status.

 

PRINCIPAL DIAGNOSIS:  Seizure in the setting of severe hyponatremia (thought 
secondary to low solutes in the setting of malnutrition and chronic alcoholism).

 

HISTORY OF PRESENT ILLNESS:  Hilario Bowden is a 68-year-old male with a 
past medical history of coronary artery disease, COPD, heart failure, factor V 
Leiden mutation, chronic hyponatremia, recent severe erosive esophagitis, 
phantom limb pain (status post right above-knee amputation).  He has been in 
[facility] since February until he signed out against medical 
advice that day prior to admission.  He was brought home and was found to have 
a generalized seizure. Please see [facility] and [facility] of Nesha Carcamo for full 
details.  In the [facility] Emergency Room, she was found to have profound 
hyponatremia with sodium of 117, lactic acidosis, and leukocytosis.  He was 
admitted to the ICU.  Dr. Haines of Nephrology was consulted.  They 
recommended hypertonic saline infusion 250 cc. Repeat sodium was 125 five hours 
later, then 126 and 127 approximately 13 hours later.  The next day 131 and 
then 129.  His uric acid level was normal at 6.3, cortisol 6.03 with a normal 
TSH of 3.72, serum osmolality 255, urine osmolality 184, urine sodium 28.  Dr. Haines thought most consistent with low solutes from his malnutrition and 
reportedly remote alcoholism versus reset osmostat. Aldosterone and renin 
levels were checked and still pending on discharge.  There was a consultation 
with Dr. Massiel Romero to help this gentleman with the best plan of care.  Son
, Mushtaq, did not want the patient to be returned to [facility] or transferred 
anywhere outside of [place].  He and his wife, Mary, wanted to take the patient 
home.  Therefore, that was the discharge plan.  He was not deemed eligible for 
hospice at this time, but was recommended for VNS with an AIM program along 
with setting up a padded wheelchair.  The patient otherwise was eager to leave 
the hospital, he was once found trying to leave the floor, so he could smoke a 
cigarette despite nicotine supplementation.  He was afebrile, hemodynamically 
stable, and he was not deemed to have acute skilled physical therapy needs by 
PT. He did have a speech swallow test.  The speech pathology demonstrated mild 
to moderate oral dysphagia characterized by confusion, ______ status, reduced 
mastication of solid foods, and he tolerated pureed texture and thin liquids 
well. He was recommended pureed texture and thin liquids, straws okay.  He had 
negative blood cultures.  No growth on urine culture.  MRSA nares were negative 
(had been positive in the past).  CRP was 10.  Toxicology screen negative.  He 
had an EEG, which was abnormal due to diffuse reactive slowing suggestive of 
nonspecific mild to moderate diffuse encephalopathy, which can be seen in the 
setting of toxic metabolic disturbance, hypoxic encephalopathy, postictal state
, or medication effect.  No epileptiform discharges or seizures detected.  He 
had a CT of the brain, no acute pathology, but paraventricular and deep 
subcortical white matter nonspecific hypodensities most likely secondary to 
chronic small vessel ischemic changes.  A chest x-ray demonstrated linear 
atelectasis at the lung bases bilaterally and evidence of COPD.

 

DISCHARGE FOLLOWUP:  Recommended with Dr. Randi Nicolas, primary care 
provider within 7 days.  BMP should be rechecked.

 

DIET:  Pureed solids and thin liquids, straws okay.

 

MEDICATIONS:  Please note that multiple attempts were made to contact [facility]
, but they would not give a list of discharge medications given that he left 
against medical advice and reportedly all his records were wiped from their 
system.  We were able to determine some via pharmacy electronic records via 
[employer], which include:

1.  Nicotrol inhaler.

2.  Trazodone 100 mg daily.

3.  Baclofen 10 mg daily (this was held on discharge given can reduce seizure 
threshold).

4.  Lisinopril 2.5 mg daily.

5.  Gabapentin 300 mg p.o. t.i.d.

6.  Duloxetine 30 mg daily.

7.  It seems he is on hydrocodone/acetaminophen 5/325 t.i.d. p.r.n. versus 
scheduled, unclear.

8.  Omeprazole 40 mg daily.

 

Additional medications he is discharged with include:

1.  Spiriva 1 capsule inhaled daily.

2.  Nicotine patch 14 mg daily.

3.  Tessalon 100 mg p.o. b.i.d.

4.  Tylenol 650 mg p.o. q.6 hours p.r.n.

5.  Guaifenesin 1200 mg p.o. b.i.d.

6.  Sennosides-docusate 1 tab p.o. daily p.r.n.

7.  Carafate 1 g p.o. a.c. h.s.

 

TIME SPENT ON DISCHARGE:  40 minutes.

 

DISPOSITION:  Home.

 

CONDITION:  Guarded.

 

 [id number]/CPS #: [id number]

MTDD